# Patient Record
Sex: FEMALE | Race: WHITE | Employment: FULL TIME | URBAN - NONMETROPOLITAN AREA
[De-identification: names, ages, dates, MRNs, and addresses within clinical notes are randomized per-mention and may not be internally consistent; named-entity substitution may affect disease eponyms.]

---

## 2018-02-27 ENCOUNTER — OFFICE VISIT (OUTPATIENT)
Dept: URGENT CARE | Age: 33
End: 2018-02-27
Payer: COMMERCIAL

## 2018-02-27 VITALS
TEMPERATURE: 98.4 F | BODY MASS INDEX: 35.78 KG/M2 | HEART RATE: 112 BPM | RESPIRATION RATE: 20 BRPM | OXYGEN SATURATION: 96 % | WEIGHT: 202 LBS | SYSTOLIC BLOOD PRESSURE: 133 MMHG | DIASTOLIC BLOOD PRESSURE: 88 MMHG

## 2018-02-27 DIAGNOSIS — H61.21 HEARING LOSS OF RIGHT EAR DUE TO CERUMEN IMPACTION: Primary | ICD-10-CM

## 2018-02-27 PROCEDURE — 69209 REMOVE IMPACTED EAR WAX UNI: CPT | Performed by: NURSE PRACTITIONER

## 2018-02-27 PROCEDURE — 99212 OFFICE O/P EST SF 10 MIN: CPT | Performed by: NURSE PRACTITIONER

## 2018-02-27 NOTE — PROGRESS NOTES
[address] [facility]  [address] [facility] [place] [address]  Dept: [phone]  Loc: [phone]      David Ly is c/o of Otalgia        HPI:     HPI  Patient presents with right ear fullness. She noticed the sensation two days ago, but upon waking up this morning she noted that her hear is fully clogged. There is only slight tenderness, she cannot hear. There has been no drainage from the ear. History reviewed. No pertinent past medical history. Past Surgical History:   Procedure Laterality Date     SECTION         History reviewed. No pertinent family history. Social History   Substance Use Topics    Smoking status: Never Smoker    Smokeless tobacco: Never Used    Alcohol use No      No current outpatient prescriptions on file. No current facility-administered medications for this visit. No Known Allergies    Health Maintenance   Topic Date Due    HIV screen  1999    DTaP/Tdap/Td vaccine (1 - Tdap) 2003    Cervical cancer screen  2005    Flu vaccine (1) 2017       Subjective:      Review of Systems   HENT: Positive for ear pain (fullness) and hearing loss. All other systems reviewed and are negative. Objective:     Physical Exam   Constitutional: She is oriented to person, place, and time. She appears well-developed and well-nourished. No distress. HENT:   Head: Normocephalic and atraumatic. Right Ear: External ear normal.   Left Ear: Tympanic membrane and external ear normal.   R canal fully occluded   Eyes: Conjunctivae and EOM are normal. Pupils are equal, round, and reactive to light. Right eye exhibits no discharge. Left eye exhibits no discharge. No scleral icterus. Neck: Normal range of motion. Neck supple. No JVD present. No thyromegaly present. Cardiovascular: Normal rate, regular rhythm and normal heart sounds. No murmur heard.   Pulmonary/Chest: Effort normal and breath sounds normal. No respiratory distress. Abdominal: Soft. Bowel sounds are normal. She exhibits no distension. There is no tenderness. Musculoskeletal: Normal range of motion. Neurological: She is alert and oriented to person, place, and time. No cranial nerve deficit. Skin: Skin is warm and dry. No rash noted. She is not diaphoretic. Psychiatric: She has a normal mood and affect. Her behavior is normal. Judgment normal.   Nursing note and vitals reviewed. /88   Pulse 112   Temp 98.4 °F (36.9 °C)   Resp 20   Wt 202 lb (91.6 kg)   SpO2 96%   BMI 35.78 kg/m²     Assessment/ Plan      1. Hearing loss of right ear due to cerumen impaction  NC REMOVAL IMPACTED CERUMEN IRRIGATION/LVG UNILAT       Orders Placed This Encounter   Procedures    NC REMOVAL IMPACTED CERUMEN IRRIGATION/LVG UNILAT        Patient given educational materials - see patient instructions. Discussed use, benefit, and side effects of prescribed medications. All patient questions answered. Pt voiced understanding. Patient agreed with treatment plan. Follow up as needed    Patient Instructions       Patient Education        Earwax Blockage: Care Instructions  Your Care Instructions    Earwax is a natural substance that protects the ear canal. Normally, earwax drains from the ears and does not cause problems. Sometimes earwax builds up and hardens. Earwax blockage (also called cerumen impaction) can cause some loss of hearing and pain. When wax is tightly packed, you will need to have your doctor remove it. Follow-up care is a key part of your treatment and safety. Be sure to make and go to all appointments, and call your doctor if you are having problems. It's also a good idea to know your test results and keep a list of the medicines you take. How can you care for yourself at home? · Do not try to remove earwax with cotton swabs, fingers, or other objects. This can make the blockage worse and damage the eardrum.   · If

## 2018-02-27 NOTE — PROGRESS NOTES
RIGHT EARS IRRIGATED WITH  ELEPHANT EAR IRRIGATION SYSTEM. LARGE AMOUNT OF EAR WAX REMOVED WITH WATER AND CURAGE.  PT TOLERATED WELL WITH PROVIDER RECHECKING EARS POST PROCEDURE

## 2018-05-22 ENCOUNTER — OFFICE VISIT (OUTPATIENT)
Dept: OBGYN | Age: 33
End: 2018-05-22
Payer: COMMERCIAL

## 2018-05-22 VITALS
WEIGHT: 189 LBS | TEMPERATURE: 98.5 F | BODY MASS INDEX: 32.27 KG/M2 | HEART RATE: 97 BPM | SYSTOLIC BLOOD PRESSURE: 133 MMHG | DIASTOLIC BLOOD PRESSURE: 94 MMHG | RESPIRATION RATE: 18 BRPM | HEIGHT: 64 IN

## 2018-05-22 DIAGNOSIS — Z80.0 FAMILY HISTORY OF COLON CANCER: ICD-10-CM

## 2018-05-22 DIAGNOSIS — Z12.4 SCREENING FOR CERVICAL CANCER: ICD-10-CM

## 2018-05-22 DIAGNOSIS — Z80.3 FAMILY HISTORY OF BREAST CANCER: ICD-10-CM

## 2018-05-22 DIAGNOSIS — Z01.419 ENCOUNTER FOR GYNECOLOGICAL EXAMINATION WITHOUT ABNORMAL FINDING: Primary | ICD-10-CM

## 2018-05-22 DIAGNOSIS — L68.0 HIRSUTISM: ICD-10-CM

## 2018-05-22 DIAGNOSIS — Z76.89 ENCOUNTER TO ESTABLISH CARE WITH NEW DOCTOR: ICD-10-CM

## 2018-05-22 PROCEDURE — 99385 PREV VISIT NEW AGE 18-39: CPT

## 2018-05-22 ASSESSMENT — ENCOUNTER SYMPTOMS
EYE PAIN: 0
ABDOMINAL PAIN: 0
DIARRHEA: 0
SHORTNESS OF BREATH: 0
RECTAL PAIN: 0
TROUBLE SWALLOWING: 0
CONSTIPATION: 0
CHEST TIGHTNESS: 0
SORE THROAT: 0
COUGH: 0
BLOOD IN STOOL: 0
BACK PAIN: 0
EYE REDNESS: 0

## 2018-05-23 ENCOUNTER — TELEPHONE (OUTPATIENT)
Dept: OBGYN | Age: 33
End: 2018-05-23

## 2018-05-23 DIAGNOSIS — R35.0 URINARY FREQUENCY: Primary | ICD-10-CM

## 2018-05-23 DIAGNOSIS — R30.0 DYSURIA: ICD-10-CM

## 2018-05-23 DIAGNOSIS — R35.0 URINARY FREQUENCY: ICD-10-CM

## 2018-05-23 LAB
BACTERIA: NEGATIVE /HPF
BILIRUBIN URINE: NEGATIVE
BLOOD, URINE: ABNORMAL
CLARITY: CLEAR
COLOR: YELLOW
EPITHELIAL CELLS, UA: 6 /HPF (ref 0–5)
GLUCOSE URINE: NEGATIVE MG/DL
HYALINE CASTS: 2 /HPF (ref 0–8)
KETONES, URINE: NEGATIVE MG/DL
LEUKOCYTE ESTERASE, URINE: ABNORMAL
NITRITE, URINE: NEGATIVE
PH UA: 6.5
PROTEIN UA: NEGATIVE MG/DL
RBC UA: 9 /HPF (ref 0–4)
SPECIFIC GRAVITY UA: 1.02
URINE REFLEX TO CULTURE: YES
UROBILINOGEN, URINE: 1 E.U./DL
WBC UA: 15 /HPF (ref 0–5)

## 2018-05-25 ENCOUNTER — TELEPHONE (OUTPATIENT)
Dept: OBGYN | Age: 33
End: 2018-05-25

## 2018-05-25 LAB
ORGANISM: ABNORMAL
URINE CULTURE, ROUTINE: ABNORMAL
URINE CULTURE, ROUTINE: ABNORMAL

## 2018-05-30 DIAGNOSIS — L68.0 HIRSUTISM: Primary | ICD-10-CM

## 2018-05-30 DIAGNOSIS — N39.0 URINARY TRACT INFECTION WITHOUT HEMATURIA, SITE UNSPECIFIED: ICD-10-CM

## 2018-05-30 RX ORDER — SPIRONOLACTONE 50 MG/1
50 TABLET, FILM COATED ORAL DAILY
Qty: 60 TABLET | Refills: 1 | Status: SHIPPED | OUTPATIENT
Start: 2018-05-30 | End: 2018-10-13 | Stop reason: SDUPTHER

## 2018-05-30 RX ORDER — NITROFURANTOIN 25; 75 MG/1; MG/1
100 CAPSULE ORAL 2 TIMES DAILY
Qty: 14 CAPSULE | Refills: 0 | Status: SHIPPED | OUTPATIENT
Start: 2018-05-30 | End: 2018-06-06

## 2018-10-13 DIAGNOSIS — L68.0 HIRSUTISM: ICD-10-CM

## 2018-10-15 RX ORDER — SPIRONOLACTONE 50 MG/1
TABLET, FILM COATED ORAL
Qty: 60 TABLET | Refills: 1 | Status: SHIPPED | OUTPATIENT
Start: 2018-10-15 | End: 2018-12-17 | Stop reason: SDUPTHER

## 2018-11-02 ENCOUNTER — APPOINTMENT (OUTPATIENT)
Dept: GENERAL RADIOLOGY | Age: 33
End: 2018-11-02
Payer: COMMERCIAL

## 2018-11-02 ENCOUNTER — OFFICE VISIT (OUTPATIENT)
Dept: URGENT CARE | Age: 33
End: 2018-11-02

## 2018-11-02 ENCOUNTER — HOSPITAL ENCOUNTER (EMERGENCY)
Age: 33
Discharge: HOME | End: 2018-11-02
Attending: EMERGENCY MEDICINE
Payer: COMMERCIAL

## 2018-11-02 VITALS
RESPIRATION RATE: 20 BRPM | BODY MASS INDEX: 31.71 KG/M2 | TEMPERATURE: 97.5 F | OXYGEN SATURATION: 98 % | HEART RATE: 90 BPM | DIASTOLIC BLOOD PRESSURE: 89 MMHG | WEIGHT: 179 LBS | SYSTOLIC BLOOD PRESSURE: 134 MMHG | HEIGHT: 63 IN

## 2018-11-02 VITALS
RESPIRATION RATE: 18 BRPM | BODY MASS INDEX: 31.89 KG/M2 | SYSTOLIC BLOOD PRESSURE: 120 MMHG | HEIGHT: 63 IN | WEIGHT: 180 LBS | HEART RATE: 88 BPM | DIASTOLIC BLOOD PRESSURE: 90 MMHG | OXYGEN SATURATION: 95 % | TEMPERATURE: 98.1 F

## 2018-11-02 DIAGNOSIS — R07.9 CHEST PAIN, UNSPECIFIED TYPE: Primary | ICD-10-CM

## 2018-11-02 DIAGNOSIS — R74.8 LIVER ENZYME ELEVATION: ICD-10-CM

## 2018-11-02 LAB
ALBUMIN SERPL-MCNC: 5.3 G/DL (ref 3.5–5.2)
ALP BLD-CCNC: 71 U/L (ref 35–104)
ALT SERPL-CCNC: 37 U/L (ref 5–33)
ANION GAP SERPL CALCULATED.3IONS-SCNC: 12 MMOL/L (ref 7–19)
AST SERPL-CCNC: 23 U/L (ref 5–32)
BILIRUB SERPL-MCNC: 0.3 MG/DL (ref 0.2–1.2)
BUN BLDV-MCNC: 14 MG/DL (ref 6–20)
CALCIUM SERPL-MCNC: 10.2 MG/DL (ref 8.6–10)
CHLORIDE BLD-SCNC: 101 MMOL/L (ref 98–111)
CO2: 27 MMOL/L (ref 22–29)
CREAT SERPL-MCNC: 0.8 MG/DL (ref 0.5–0.9)
D DIMER: <0.27 UG/ML FEU (ref 0–0.48)
EKG P AXIS: 49 DEGREES
EKG P AXIS: 51 DEGREES
EKG P-R INTERVAL: 158 MS
EKG P-R INTERVAL: 170 MS
EKG Q-T INTERVAL: 356 MS
EKG Q-T INTERVAL: 358 MS
EKG QRS DURATION: 84 MS
EKG QRS DURATION: 88 MS
EKG QTC CALCULATION (BAZETT): 408 MS
EKG QTC CALCULATION (BAZETT): 413 MS
EKG T AXIS: 34 DEGREES
EKG T AXIS: 52 DEGREES
GFR NON-AFRICAN AMERICAN: >60
GLUCOSE BLD-MCNC: 84 MG/DL (ref 74–109)
HCG QUALITATIVE: NEGATIVE
HCT VFR BLD CALC: 43.9 % (ref 37–47)
HEMOGLOBIN: 14.7 G/DL (ref 12–16)
LV EF: 50 %
LVEF MODALITY: NORMAL
MCH RBC QN AUTO: 29.3 PG (ref 27–31)
MCHC RBC AUTO-ENTMCNC: 33.5 G/DL (ref 33–37)
MCV RBC AUTO: 87.5 FL (ref 81–99)
PDW BLD-RTO: 12.1 % (ref 11.5–14.5)
PERFORMED ON: NORMAL
PERFORMED ON: NORMAL
PLATELET # BLD: 333 K/UL (ref 130–400)
PMV BLD AUTO: 9.2 FL (ref 9.4–12.3)
POC TROPONIN I: 0 NG/ML (ref 0–0.08)
POC TROPONIN I: 0 NG/ML (ref 0–0.08)
POTASSIUM SERPL-SCNC: 4.2 MMOL/L (ref 3.5–5)
RBC # BLD: 5.02 M/UL (ref 4.2–5.4)
SODIUM BLD-SCNC: 140 MMOL/L (ref 136–145)
TOTAL PROTEIN: 8.7 G/DL (ref 6.6–8.7)
WBC # BLD: 8.9 K/UL (ref 4.8–10.8)

## 2018-11-02 PROCEDURE — 84703 CHORIONIC GONADOTROPIN ASSAY: CPT

## 2018-11-02 PROCEDURE — 36415 COLL VENOUS BLD VENIPUNCTURE: CPT

## 2018-11-02 PROCEDURE — 99999 PR OFFICE/OUTPT VISIT,PROCEDURE ONLY: CPT | Performed by: SPECIALIST

## 2018-11-02 PROCEDURE — 71046 X-RAY EXAM CHEST 2 VIEWS: CPT

## 2018-11-02 PROCEDURE — 99285 EMERGENCY DEPT VISIT HI MDM: CPT | Performed by: EMERGENCY MEDICINE

## 2018-11-02 PROCEDURE — 93005 ELECTROCARDIOGRAM TRACING: CPT

## 2018-11-02 PROCEDURE — 85027 COMPLETE CBC AUTOMATED: CPT

## 2018-11-02 PROCEDURE — 99285 EMERGENCY DEPT VISIT HI MDM: CPT

## 2018-11-02 PROCEDURE — 93350 STRESS TTE ONLY: CPT

## 2018-11-02 PROCEDURE — 85379 FIBRIN DEGRADATION QUANT: CPT

## 2018-11-02 PROCEDURE — 84484 ASSAY OF TROPONIN QUANT: CPT

## 2018-11-02 PROCEDURE — 80053 COMPREHEN METABOLIC PANEL: CPT

## 2018-11-02 ASSESSMENT — ENCOUNTER SYMPTOMS
VOMITING: 0
EYE PAIN: 0
DIARRHEA: 0
SHORTNESS OF BREATH: 0
ABDOMINAL PAIN: 0

## 2018-11-02 ASSESSMENT — HEART SCORE: ECG: 0

## 2018-11-02 NOTE — ED PROVIDER NOTES
 TUBAL LIGATION  2007    Dr. Wade Singh       Previous Medications    SPIRONOLACTONE (ALDACTONE) 50 MG TABLET    TAKE 1 TABLET BY MOUTH EVERY DAY       ALLERGIES     Patient has no known allergies. FAMILY HISTORY       Family History   Problem Relation Age of Onset    Breast Cancer Mother 72    Colon Cancer Maternal Grandmother         Uterine and color          SOCIAL HISTORY       Social History     Social History    Marital status:      Spouse name: N/A    Number of children: N/A    Years of education: N/A     Social History Main Topics    Smoking status: Never Smoker    Smokeless tobacco: Never Used    Alcohol use No    Drug use: No    Sexual activity: Not Asked     Other Topics Concern    None     Social History Narrative    None       SCREENINGS      Heart Score for chest pain patients  History: Slightly Suspicious  ECG: Normal  Patient Age: < 45 years  *Risk factors for Atherosclerotic disease: Hypertension, Positive family History, Obesity  Risk Factors: > 3 Risk factors or history of atherosclerotic disease*  Troponin: < 1X normal limit  Heart Score Total: 2      PHYSICAL EXAM    (up to 7 for level 4, 8 or more for level 5)     ED Triage Vitals [11/02/18 0919]   BP Temp Temp src Pulse Resp SpO2 Height Weight   135/85 98.4 °F (36.9 °C) -- 100 18 97 % 5' 3\" (1.6 m) 180 lb (81.6 kg)       Physical Exam   Constitutional: She is oriented to person, place, and time. She appears well-developed. No distress. HENT:   Head: Normocephalic and atraumatic. Eyes: Pupils are equal, round, and reactive to light. No scleral icterus. Neck: Normal range of motion. Neck supple. No JVD present. Cardiovascular: Normal rate, regular rhythm, normal heart sounds and intact distal pulses. Pulmonary/Chest: Effort normal and breath sounds normal. No respiratory distress. Abdominal: Soft. She exhibits no distension. There is no tenderness.    Musculoskeletal: She exhibits no edema or tenderness. Neurological: She is alert and oriented to person, place, and time. Skin: Skin is warm and dry. Psychiatric: She has a normal mood and affect. Her behavior is normal.   Vitals reviewed. DIAGNOSTIC RESULTS     EKG: All EKG's are interpreted by the Emergency Department Physician who either signs or Co-signs this chart in the absence of a cardiologist.    Normal sinus rhythm. Normal QT interval. No evidence of ischemia. Repeat EKG shows normal sinus rhythm. Normal QT interval. No evidence of ischemia. RADIOLOGY:   Non-plain film images such as CT, Ultrasound and MRI are read by the radiologist. [employer] images are visualized and preliminarily interpreted by the emergency physician with the below findings:    Interpretation per the Radiologist below, if available at the time of this note:    XR CHEST STANDARD (2 VW)   Final Result   No active cardiopulmonary disease. The above finding are recorded on a digital voice clip in PACS. Signed by Dr Dorothy Garner on 11/2/2018 9:54 AM            LABS:  Labs Reviewed   CBC - Abnormal; Notable for the following:        Result Value    MPV 9.2 (*)     All other components within normal limits   COMPREHENSIVE METABOLIC PANEL - Abnormal; Notable for the following:     Calcium 10.2 (*)     Alb 5.3 (*)     ALT 37 (*)     All other components within normal limits   HCG, SERUM, QUALITATIVE   D-DIMER, QUANTITATIVE   POCT TROPONIN   POCT TROPONIN   POCT VENOUS   POCT VENOUS       All other labs were within normal range or not returned as of this dictation. EMERGENCY DEPARTMENT COURSE and DIFFERENTIALDIAGNOSIS/MDM:   Vitals:    Vitals:    11/02/18 1030 11/02/18 1100 11/02/18 1130 11/02/18 1200   BP:  112/72 116/70 114/68   Pulse:       Resp: 18 18 18 16   Temp:       SpO2:  97% 96% 95%   Weight:       Height:           MDM  Patient resting comfortably. No distress at this time. Nontoxic on exam. Asymptomatic.  Stress echo was done which

## 2018-11-02 NOTE — PROGRESS NOTES
The patient came in to our clinic with complaints of \"chest discomfort\". In triage the patient kept stating that \"it feels like someone has their hand in my chest and is squeezing my heart really tight\". The patient rates her pain at a 4 on a scale from 1-10. The patient reports that she has a family history of heart disease. The patient was seen my CALDERON De Oliveira. Adrienne recommended that the patient go to the ED for further evaluation. The patient was offered an ambulance, but declined. The patient left our clinic in stable condition. Reviewed above.  CALDERON Tamayo

## 2018-12-17 DIAGNOSIS — L68.0 HIRSUTISM: ICD-10-CM

## 2018-12-17 RX ORDER — SPIRONOLACTONE 50 MG/1
TABLET, FILM COATED ORAL
Qty: 90 TABLET | Refills: 1 | Status: SHIPPED | OUTPATIENT
Start: 2018-12-17 | End: 2019-06-15 | Stop reason: SDUPTHER

## 2019-02-01 ENCOUNTER — OFFICE VISIT (OUTPATIENT)
Dept: RETAIL CLINIC | Facility: CLINIC | Age: 34
End: 2019-02-01

## 2019-02-01 VITALS
SYSTOLIC BLOOD PRESSURE: 102 MMHG | DIASTOLIC BLOOD PRESSURE: 74 MMHG | BODY MASS INDEX: 32.14 KG/M2 | OXYGEN SATURATION: 98 % | TEMPERATURE: 98.6 F | HEART RATE: 83 BPM | WEIGHT: 181.4 LBS | RESPIRATION RATE: 18 BRPM | HEIGHT: 63 IN

## 2019-02-01 DIAGNOSIS — R39.15 URINARY URGENCY: ICD-10-CM

## 2019-02-01 DIAGNOSIS — N30.91 CYSTITIS WITH HEMATURIA: Primary | ICD-10-CM

## 2019-02-01 LAB
BILIRUB BLD-MCNC: NEGATIVE MG/DL
CLARITY, POC: CLEAR
COLOR UR: ABNORMAL
GLUCOSE UR STRIP-MCNC: NEGATIVE MG/DL
KETONES UR QL: NEGATIVE
LEUKOCYTE EST, POC: ABNORMAL
NITRITE UR-MCNC: NEGATIVE MG/ML
PH UR: 6 [PH] (ref 5–8)
PROT UR STRIP-MCNC: NEGATIVE MG/DL
RBC # UR STRIP: ABNORMAL /UL
SP GR UR: 1.02 (ref 1–1.03)
UROBILINOGEN UR QL: NORMAL

## 2019-02-01 PROCEDURE — 99203 OFFICE O/P NEW LOW 30 MIN: CPT | Performed by: NURSE PRACTITIONER

## 2019-02-01 PROCEDURE — 81003 URINALYSIS AUTO W/O SCOPE: CPT | Performed by: NURSE PRACTITIONER

## 2019-02-01 RX ORDER — SPIRONOLACTONE 50 MG/1
50 TABLET, FILM COATED ORAL DAILY
Refills: 1 | COMMUNITY
Start: 2018-12-23

## 2019-02-01 RX ORDER — SULFAMETHOXAZOLE AND TRIMETHOPRIM 800; 160 MG/1; MG/1
1 TABLET ORAL 2 TIMES DAILY
Qty: 6 TABLET | Refills: 0 | Status: SHIPPED | OUTPATIENT
Start: 2019-02-01 | End: 2019-02-04

## 2019-02-01 NOTE — PATIENT INSTRUCTIONS
You have been diagnosed with a UTI or bladder infection.You have been counseled with the results of your urinalysis.  An antibiotic is prescribed for you today that needs to be taken until gone, whether or not you feel better. It is recommended you take a probiotic when taking an antibiotic. Probiotics are found over the counter in pill form and in some yogurt brands, both are recommended.    Increase fluid intake such as water and low sugar cranberry juice. Avoid caffeine as it irritates the bladder wall. Take over the counter AZO Standard as directed for no more than 2 days, for bladder pain. This medication will turn your urine orange or red.  Use Motrin or Tylenol for fever/pain.     For recurrent infections it is best to avoid bathing in a tub, always wipe front to back, urinate before and after intercourse, avoid tight fitting pants, and wear cotton underwear.    Go to your primary care provider, urgent care center, or emergency room if no improvement or you have increase in symptoms such as fever, nausea, vomiting, flank pain or other concerns.     We are sending your urine to [facility] for culture.  We will call you with results in 3-4 days.  If you have not heard from us after four days please call for results.

## 2019-02-01 NOTE — PROGRESS NOTES
Chief Complaint   Patient presents with   • Urinary Urgency     Subjective   Georgie Mcmanus is a 33 y.o. female who presents to the clinic today with complaints of urinary urgency, frequency, mild burning and pressure with urination.   Urinary Tract Infection    This is a new problem. The current episode started today (around 3am). The problem has been gradually worsening. The quality of the pain is described as burning. There has been no fever. She is sexually active. There is no history of pyelonephritis. Associated symptoms include frequency and urgency. Pertinent negatives include no chills, discharge, flank pain, hematuria, hesitancy, nausea, possible pregnancy or vomiting. She has tried nothing for the symptoms. There is no history of kidney stones or recurrent UTIs.     Current Outpatient Medications:   •  spironolactone (ALDACTONE) 50 MG tablet, Take 50 mg by mouth Daily., Disp: , Rfl: 1    Allergies:  Patient has no known allergies.    Past Medical History:   Diagnosis Date   • Hypertension      Past Surgical History:   Procedure Laterality Date   • TUBAL ABDOMINAL LIGATION       Family History   Problem Relation Age of Onset   • Cancer Mother    • Cancer Father    • Heart disease Father    • Cancer Maternal Grandmother    • Heart disease Maternal Grandfather    • Stroke Paternal Grandmother    • Cancer Paternal Grandfather      Social History     Tobacco Use   • Smoking status: Never Smoker   • Smokeless tobacco: Never Used   Substance Use Topics   • Alcohol use: Yes     Comment: 2x/year   • Drug use: Defer       Review of Systems  Review of Systems   Constitutional: Negative for chills.   Gastrointestinal: Negative for abdominal pain, constipation, diarrhea, nausea and vomiting.   Genitourinary: Positive for dysuria, frequency and urgency. Negative for flank pain, genital sores, hematuria, hesitancy, vaginal bleeding, vaginal discharge and vaginal pain.       Objective   /74   Pulse 83   Temp 98.6  "°F (37 °C) (Oral)   Resp 18   Ht 160 cm (63\")   Wt 82.3 kg (181 lb 6.4 oz)   LMP 01/07/2019 (Exact Date)   SpO2 98%   Breastfeeding? No   BMI 32.13 kg/m²       Physical Exam   Constitutional: She is oriented to person, place, and time. She appears well-developed and well-nourished. No distress.   Cardiovascular: Normal rate, regular rhythm and normal heart sounds.   Pulmonary/Chest: Effort normal and breath sounds normal. No respiratory distress.   Abdominal: Soft. Normal appearance and bowel sounds are normal. There is no tenderness. There is no rigidity, no rebound, no guarding and no CVA tenderness.   Neurological: She is alert and oriented to person, place, and time.   Skin: Skin is warm and dry.   Psychiatric: She has a normal mood and affect. Her behavior is normal.       Assessment/Plan     Georgie was seen today for urinary urgency.    Diagnoses and all orders for this visit:    Cystitis with hematuria  -     Cancel: Urine Culture - Urine, Urine, Clean Catch; Future  -     Urine Culture - Urine, Urine, Clean Catch    Urinary urgency  -     POC Urinalysis Dipstick, Automated    Other orders  -     sulfamethoxazole-trimethoprim (BACTRIM DS,SEPTRA DS) 800-160 MG per tablet; Take 1 tablet by mouth 2 (Two) Times a Day for 3 days.          POC Urinalysis Dipstick, Automated   Order: [id number]   Status:  Final result   Visible to patient:  No (Not Released)   Dx:  Urinary urgency    Ref Range & Units 08:23   Color Yellow, Straw, Dark Yellow, Leida Leida    Clarity, UA Clear Clear    Specific Gravity  1.005 - 1.030 1.025    pH, Urine 5.0 - 8.0 6.0    Leukocytes Negative Trace Abnormal     Nitrite, UA Negative Negative    Protein, POC Negative mg/dL Negative    Glucose, UA Negative, 1000 mg/dL (3+) mg/dL Negative    Ketones, UA Negative Negative    Urobilinogen, UA Normal Normal    Bilirubin Negative Negative    Blood, UA Negative Small Abnormal     Resulting Agency  [facility]       "   Specimen Collected: 02/01/19 08:23 Last Resulted: 02/01/19 08:25           You have been diagnosed with a UTI or bladder infection.You have been counseled with the results of your urinalysis.  An antibiotic is prescribed for you today that needs to be taken until gone, whether or not you feel better. It is recommended you take a probiotic when taking an antibiotic. Probiotics are found over the counter in pill form and in some yogurt brands, both are recommended.    Increase fluid intake such as water and low sugar cranberry juice. Avoid caffeine as it irritates the bladder wall. Take over the counter AZO Standard as directed for no more than 2 days, for bladder pain. This medication will turn your urine orange or red.  Use Motrin or Tylenol for fever/pain.     For recurrent infections it is best to avoid bathing in a tub, always wipe front to back, urinate before and after intercourse, avoid tight fitting pants, and wear cotton underwear.    Go to your primary care provider, urgent care center, or emergency room if no improvement or you have increase in symptoms such as fever, nausea, vomiting, flank pain or other concerns.     We are sending your urine to [facility] for culture.  We will call you with results in 3-4 days.  If you have not heard from us after four days please call for results.

## 2019-02-03 LAB
BACTERIA UR CULT: NORMAL
BACTERIA UR CULT: NORMAL

## 2019-02-05 ENCOUNTER — TELEPHONE (OUTPATIENT)
Dept: RETAIL CLINIC | Facility: CLINIC | Age: 34
End: 2019-02-05

## 2019-02-05 NOTE — TELEPHONE ENCOUNTER
Patient notified of urine culture results.  Patient states she is feeling much better.  Instructed patient if symptoms re-occur, to follow up with her primary care physician.  Patient voiced understanding.

## 2019-03-26 ENCOUNTER — OFFICE VISIT (OUTPATIENT)
Dept: URGENT CARE | Age: 34
End: 2019-03-26
Payer: COMMERCIAL

## 2019-03-26 VITALS
BODY MASS INDEX: 32.95 KG/M2 | RESPIRATION RATE: 18 BRPM | TEMPERATURE: 97.4 F | SYSTOLIC BLOOD PRESSURE: 122 MMHG | DIASTOLIC BLOOD PRESSURE: 82 MMHG | WEIGHT: 186 LBS | OXYGEN SATURATION: 98 % | HEART RATE: 77 BPM

## 2019-03-26 DIAGNOSIS — J02.9 SORE THROAT: Primary | ICD-10-CM

## 2019-03-26 DIAGNOSIS — J02.0 STREP PHARYNGITIS: ICD-10-CM

## 2019-03-26 LAB — S PYO AG THROAT QL: POSITIVE

## 2019-03-26 PROCEDURE — 87880 STREP A ASSAY W/OPTIC: CPT | Performed by: NURSE PRACTITIONER

## 2019-03-26 PROCEDURE — 99213 OFFICE O/P EST LOW 20 MIN: CPT | Performed by: NURSE PRACTITIONER

## 2019-03-26 RX ORDER — AMOXICILLIN 500 MG/1
500 CAPSULE ORAL 2 TIMES DAILY
Qty: 20 CAPSULE | Refills: 0 | Status: SHIPPED | OUTPATIENT
Start: 2019-03-26 | End: 2019-04-05

## 2019-03-26 ASSESSMENT — ENCOUNTER SYMPTOMS
TROUBLE SWALLOWING: 1
VOICE CHANGE: 0
ABDOMINAL PAIN: 0
NAUSEA: 0
COUGH: 0
RHINORRHEA: 0
RESPIRATORY NEGATIVE: 1
VOMITING: 0
SORE THROAT: 1
EYES NEGATIVE: 1
WHEEZING: 0

## 2019-06-15 DIAGNOSIS — L68.0 HIRSUTISM: ICD-10-CM

## 2019-06-17 RX ORDER — SPIRONOLACTONE 50 MG/1
TABLET, FILM COATED ORAL
Qty: 90 TABLET | Refills: 1 | Status: SHIPPED | OUTPATIENT
Start: 2019-06-17 | End: 2019-12-08 | Stop reason: SDUPTHER

## 2019-07-08 ENCOUNTER — OFFICE VISIT (OUTPATIENT)
Dept: OBGYN | Age: 34
End: 2019-07-08
Payer: COMMERCIAL

## 2019-07-08 VITALS
WEIGHT: 178 LBS | HEIGHT: 63 IN | HEART RATE: 96 BPM | BODY MASS INDEX: 31.54 KG/M2 | SYSTOLIC BLOOD PRESSURE: 123 MMHG | DIASTOLIC BLOOD PRESSURE: 86 MMHG

## 2019-07-08 DIAGNOSIS — Z79.899 ENCOUNTER FOR LONG-TERM (CURRENT) USE OF MEDICATIONS: ICD-10-CM

## 2019-07-08 DIAGNOSIS — Z80.3 FAMILY HISTORY OF BREAST CANCER: ICD-10-CM

## 2019-07-08 DIAGNOSIS — Z01.419 ENCOUNTER FOR GYNECOLOGICAL EXAMINATION WITHOUT ABNORMAL FINDING: ICD-10-CM

## 2019-07-08 DIAGNOSIS — Z11.51 SCREENING FOR HPV (HUMAN PAPILLOMAVIRUS): ICD-10-CM

## 2019-07-08 DIAGNOSIS — Z12.4 ENCOUNTER FOR SCREENING FOR CERVICAL CANCER: ICD-10-CM

## 2019-07-08 DIAGNOSIS — N92.6 IRREGULAR MENSES: Primary | ICD-10-CM

## 2019-07-08 PROCEDURE — 99395 PREV VISIT EST AGE 18-39: CPT | Performed by: ADVANCED PRACTICE MIDWIFE

## 2019-07-08 ASSESSMENT — ENCOUNTER SYMPTOMS
EYES NEGATIVE: 1
GASTROINTESTINAL NEGATIVE: 1
ALLERGIC/IMMUNOLOGIC NEGATIVE: 1
RESPIRATORY NEGATIVE: 1

## 2019-07-08 NOTE — PROGRESS NOTES
Pt presents today for pap smear and breast exam.     Last mammogram:  Never had one  Last pap smear:  2018, normal  Contraception:  TL  :  2  Para:  2  AB:  0  Last bone density:  Never had one  Last colonoscopy: never had one  Menarche: 4years old  LMP: 2019  Menses: regular now due to aldactone

## 2019-07-16 LAB
HPV SAMPLE: NORMAL
HPV TYPE 16: NOT DETECTED
HPV TYPE 18: NOT DETECTED
HPV, HIGH RISK OTHER: NOT DETECTED
INTERPRETATION: NORMAL
SOURCE: NORMAL

## 2019-07-22 DIAGNOSIS — Z79.899 ENCOUNTER FOR LONG-TERM (CURRENT) USE OF MEDICATIONS: ICD-10-CM

## 2019-07-22 DIAGNOSIS — N92.6 IRREGULAR MENSES: ICD-10-CM

## 2019-07-22 LAB
ALBUMIN SERPL-MCNC: 4.6 G/DL (ref 3.5–5.2)
ALP BLD-CCNC: 42 U/L (ref 35–104)
ALT SERPL-CCNC: 25 U/L (ref 5–33)
ANION GAP SERPL CALCULATED.3IONS-SCNC: 13 MMOL/L (ref 7–19)
AST SERPL-CCNC: 20 U/L (ref 5–32)
BILIRUB SERPL-MCNC: 0.3 MG/DL (ref 0.2–1.2)
BUN BLDV-MCNC: 19 MG/DL (ref 6–20)
CALCIUM SERPL-MCNC: 10 MG/DL (ref 8.6–10)
CHLORIDE BLD-SCNC: 104 MMOL/L (ref 98–111)
CO2: 24 MMOL/L (ref 22–29)
CORTISOL - AM: 11.6 UG/DL (ref 6.2–19.4)
CREAT SERPL-MCNC: 0.6 MG/DL (ref 0.5–0.9)
GFR NON-AFRICAN AMERICAN: >60
GLUCOSE BLD-MCNC: 91 MG/DL (ref 74–109)
HCT VFR BLD CALC: 38.8 % (ref 37–47)
HEMOGLOBIN: 12.5 G/DL (ref 12–16)
INSULIN: 12.9 MU/L (ref 2.6–24.9)
MCH RBC QN AUTO: 29.5 PG (ref 27–31)
MCHC RBC AUTO-ENTMCNC: 32.2 G/DL (ref 33–37)
MCV RBC AUTO: 91.5 FL (ref 81–99)
PDW BLD-RTO: 12.3 % (ref 11.5–14.5)
PLATELET # BLD: 316 K/UL (ref 130–400)
PMV BLD AUTO: 9.9 FL (ref 9.4–12.3)
POTASSIUM SERPL-SCNC: 5 MMOL/L (ref 3.5–5)
RBC # BLD: 4.24 M/UL (ref 4.2–5.4)
SODIUM BLD-SCNC: 141 MMOL/L (ref 136–145)
TOTAL PROTEIN: 7.9 G/DL (ref 6.6–8.7)
WBC # BLD: 7.2 K/UL (ref 4.8–10.8)

## 2019-12-08 DIAGNOSIS — L68.0 HIRSUTISM: ICD-10-CM

## 2019-12-09 RX ORDER — SPIRONOLACTONE 50 MG/1
TABLET, FILM COATED ORAL
Qty: 90 TABLET | Refills: 1 | Status: SHIPPED | OUTPATIENT
Start: 2019-12-09 | End: 2020-07-09 | Stop reason: SDUPTHER

## 2020-03-05 ENCOUNTER — OFFICE VISIT (OUTPATIENT)
Dept: RETAIL CLINIC | Facility: CLINIC | Age: 35
End: 2020-03-05

## 2020-03-05 VITALS — TEMPERATURE: 99.1 F | HEART RATE: 86 BPM | SYSTOLIC BLOOD PRESSURE: 112 MMHG | DIASTOLIC BLOOD PRESSURE: 70 MMHG

## 2020-03-05 DIAGNOSIS — R35.0 FREQUENCY OF URINATION: Primary | ICD-10-CM

## 2020-03-05 PROBLEM — Z80.3 FAMILY HISTORY OF BREAST CANCER: Status: ACTIVE | Noted: 2018-05-22

## 2020-03-05 PROBLEM — Z80.0 FAMILY HISTORY OF COLON CANCER: Status: ACTIVE | Noted: 2018-05-22

## 2020-03-05 LAB
BILIRUB BLD-MCNC: NEGATIVE MG/DL
CLARITY, POC: ABNORMAL
COLOR UR: YELLOW
GLUCOSE UR STRIP-MCNC: NEGATIVE MG/DL
KETONES UR QL: NEGATIVE
LEUKOCYTE EST, POC: NEGATIVE
NITRITE UR-MCNC: NEGATIVE MG/ML
PH UR: 5.5 [PH] (ref 5–8)
PROT UR STRIP-MCNC: NEGATIVE MG/DL
RBC # UR STRIP: NEGATIVE /UL
SP GR UR: 1.03 (ref 1–1.03)
UROBILINOGEN UR QL: NORMAL

## 2020-03-05 PROCEDURE — 81003 URINALYSIS AUTO W/O SCOPE: CPT | Performed by: NURSE PRACTITIONER

## 2020-03-05 PROCEDURE — 99213 OFFICE O/P EST LOW 20 MIN: CPT | Performed by: NURSE PRACTITIONER

## 2020-03-05 RX ORDER — CEFDINIR 300 MG/1
600 CAPSULE ORAL DAILY
Qty: 10 CAPSULE | Refills: 0 | Status: SHIPPED | OUTPATIENT
Start: 2020-03-05 | End: 2020-03-10

## 2020-03-05 NOTE — PATIENT INSTRUCTIONS
Urinary Frequency, Adult  Urinary frequency means urinating more often than usual. You may urinate every 1-2 hours even though you drink a normal amount of fluid and do not have a bladder infection or condition. Although you urinate more often than normal, the total amount of urine produced in a day is normal.  With urinary frequency, you may have an urgent need to urinate often. The stress and anxiety of needing to find a bathroom quickly can make this urge worse. This condition may go away on its own or you may need treatment at home. Home treatment may include bladder training, exercises, taking medicines, or making changes to your diet.  Follow these instructions at home:  Bladder health    · Keep a bladder diary if told by your health care provider. Keep track of:  ? What you eat and drink.  ? How often you urinate.  ? How much you urinate.  · Follow a bladder training program if told by your health care provider. This may include:  ? Learning to delay going to the bathroom.  ? Double urinating (voiding). This helps if you are not completely emptying your bladder.  ? Scheduled voiding.  · Do Kegel exercises as told by your health care provider. Kegel exercises strengthen the muscles that help control urination, which may help the condition.  Eating and drinking  · If told by your health care provider, make diet changes, such as:  ? Avoiding caffeine.  ? Drinking fewer fluids, especially alcohol.  ? Not drinking in the evening.  ? Avoiding foods or drinks that may irritate the bladder. These include coffee, tea, soda, artificial sweeteners, citrus, tomato-based foods, and chocolate.  ? Eating foods that help prevent or ease constipation. Constipation can make this condition worse. Your health care provider may recommend that you:  § Drink enough fluid to keep your urine pale yellow.  § Take over-the-counter or prescription medicines.  § Eat foods that are high in fiber, such as beans, whole grains, and fresh  fruits and vegetables.  § Limit foods that are high in fat and processed sugars, such as fried or sweet foods.  General instructions  · Take over-the-counter and prescription medicines only as told by your health care provider.  · Keep all follow-up visits as told by your health care provider. This is important.  Contact a health care provider if:  · You start urinating more often.  · You feel pain or irritation when you urinate.  · You notice blood in your urine.  · Your urine looks cloudy.  · You develop a fever.  · You begin vomiting.  Get help right away if:  · You are unable to urinate.  Summary  · Urinary frequency means urinating more often than usual. With urinary frequency, you may urinate every 1-2 hours even though you drink a normal amount of fluid and do not have a bladder infection or other bladder condition.  · Your health care provider may recommend that you keep a bladder diary, follow a bladder training program, or make dietary changes.  · If told by your health care provider, do Kegel exercises to strengthen the muscles that help control urination.  · Take over-the-counter and prescription medicines only as told by your health care provider.  · Contact a health care provider if your symptoms do not improve or get worse.  This information is not intended to replace advice given to you by your health care provider. Make sure you discuss any questions you have with your health care provider.  Document Released: 10/14/2010 Document Revised: 06/27/2019 Document Reviewed: 06/27/2019  [employer] Interactive Patient Education © 2020 [employer]

## 2020-03-05 NOTE — PROGRESS NOTES
"Akila Mcmanus is a 34 y.o. female.     Symptoms ongoing for a month.  Last culture \"was negative but they prescribed me an antibiotic and the symptoms were gone within a few days.\"    Urinary Tract Infection    This is a new problem. The current episode started 1 to 4 weeks ago (approx 1 month). The problem occurs every urination. The problem has been unchanged. Quality: just frequency and urgency. The patient is experiencing no pain. There has been no fever. She is sexually active. There is no history of pyelonephritis. Associated symptoms include frequency and urgency. Pertinent negatives include no chills, discharge, flank pain, hematuria, hesitancy, nausea, possible pregnancy, sweats or vomiting. She has tried nothing for the symptoms.        The following portions of the patient's history were reviewed and updated as appropriate: allergies, current medications, past family history, past medical history, past social history, past surgical history and problem list.    Review of Systems   Constitutional: Negative for chills and fever.   Respiratory: Negative for shortness of breath.    Cardiovascular: Negative for chest pain.   Gastrointestinal: Negative for abdominal pain, diarrhea, nausea and vomiting.   Genitourinary: Positive for frequency and urgency. Negative for dysuria, flank pain, hematuria and hesitancy.   Musculoskeletal: Negative for neck pain and neck stiffness.       Objective      /70   Pulse 86   Temp 99.1 °F (37.3 °C)     Physical Exam   Constitutional: She is oriented to person, place, and time. She appears well-developed and well-nourished. No distress.   Eyes: Pupils are equal, round, and reactive to light.   Neck: Normal range of motion. Neck supple.   Cardiovascular: Normal rate.   Pulmonary/Chest: Effort normal.   Abdominal: Soft. There is no tenderness. There is no CVA tenderness.   Musculoskeletal: Normal range of motion.   Neurological: She is alert and oriented to " person, place, and time. No cranial nerve deficit.   Skin: Skin is warm and dry. Capillary refill takes less than 2 seconds.   Psychiatric: She has a normal mood and affect.   Nursing note and vitals reviewed.    Lab Results (last 24 hours)     Procedure Component Value Units Date/Time    POCT urinalysis dipstick, automated [[id number]]  (Abnormal) Collected:  03/05/20 1531    Specimen:  Urine Updated:  03/05/20 1532     Color Yellow     Clarity, UA Cloudy     Specific Gravity  1.030     pH, Urine 5.5     Leukocytes Negative     Nitrite, UA Negative     Protein, POC Negative mg/dL      Glucose, UA Negative mg/dL      Ketones, UA Negative     Urobilinogen, UA Normal     Bilirubin Negative     Blood, UA Negative            Assessment/Plan   Georgie was seen today for urinary tract infection.    Diagnoses and all orders for this visit:    Frequency of urination  -     POCT urinalysis dipstick, automated    Other orders  -     cefdinir (OMNICEF) 300 MG capsule; Take 2 capsules by mouth Daily for 5 days.      I do not have a microscope with which to examine urine, so I did elect to treat for symptoms with 5 days of antibiotic, though I favor other reasons for frequency in differential (I.e. OAB, hormonal etiologies, possibly dropped bladder, etc; all of which were discussed with patient).  Advised to follow up with a HIGHER LEVEL OF CARE should symptoms persist.  Encouraged hydration with water.    [address]    NOTE: Patient is self pay.

## 2020-07-07 NOTE — PROGRESS NOTES
Pt presents today for pap smear and breast exam.  She also complains of     Last mammogram:  never  Last pap smear:  , negative  Contraception:  TL  :  2  Para:  2  AB:  0  Last bone density:  never  Last colonoscopy: never  Menarche:   LMP:   Menses: ***/***

## 2020-07-09 ENCOUNTER — OFFICE VISIT (OUTPATIENT)
Dept: OBGYN | Age: 35
End: 2020-07-09
Payer: MEDICARE

## 2020-07-09 VITALS
HEART RATE: 100 BPM | SYSTOLIC BLOOD PRESSURE: 124 MMHG | DIASTOLIC BLOOD PRESSURE: 84 MMHG | WEIGHT: 180 LBS | HEIGHT: 63 IN | BODY MASS INDEX: 31.89 KG/M2

## 2020-07-09 PROCEDURE — 99395 PREV VISIT EST AGE 18-39: CPT | Performed by: ADVANCED PRACTICE MIDWIFE

## 2020-07-09 RX ORDER — SPIRONOLACTONE 50 MG/1
TABLET, FILM COATED ORAL
Qty: 90 TABLET | Refills: 3 | Status: SHIPPED | OUTPATIENT
Start: 2020-07-09 | End: 2021-07-19

## 2020-07-09 ASSESSMENT — ENCOUNTER SYMPTOMS
ALLERGIC/IMMUNOLOGIC NEGATIVE: 1
RESPIRATORY NEGATIVE: 1
EYES NEGATIVE: 1
GASTROINTESTINAL NEGATIVE: 1

## 2020-07-09 NOTE — PROGRESS NOTES
[facility]  CNM Office Note    Link Javier is a 38 y.o. female who presents today for her medical conditions/ complaints as noted below. Chief Complaint   Patient presents with    Annual Exam         HPI  William eMlo presents for annual exam. She reports normal regular periods and no sexual dysfunction. She has no complaints today. She desires genetic screening for hereditary cancer risk assessment. Last mammogram:  na  Last pap smear:  2019  Contraception:  TL  :  2  Para:  2  AB:    Last bone density: na   Last colonoscopy:   na     GYN:  9 / reg / 4.      Patient Active Problem List   Diagnosis    Family history of breast cancer    Family history of colon cancer       No LMP recorded. [id number]    No past medical history on file. Past Surgical History:   Procedure Laterality Date     SECTION      TUBAL LIGATION      Dr. Keke Burnett     Family History   Problem Relation Age of Onset    Breast Cancer Mother 72    Colon Cancer Maternal Grandmother         Uterine and color     Social History     Tobacco Use    Smoking status: Never Smoker    Smokeless tobacco: Never Used   Substance Use Topics    Alcohol use: No       Current Outpatient Medications   Medication Sig Dispense Refill    spironolactone (ALDACTONE) 50 MG tablet TAKE 1 TABLET BY MOUTH EVERY DAY 90 tablet 1    metFORMIN (GLUCOPHAGE) 500 MG tablet Take 1 tablet by mouth 2 times daily (with meals) 180 tablet 2     No current facility-administered medications for this visit. No Known Allergies  Vitals:    20 0842   BP: 124/84   Pulse: 100     Body mass index is 31.89 kg/m². Review of Systems   Constitutional: Negative. HENT: Negative. Eyes: Negative. Respiratory: Negative. Cardiovascular: Negative. Gastrointestinal: Negative. Endocrine: Negative. Genitourinary: Negative. Musculoskeletal: Negative. Skin: Negative. Allergic/Immunologic: Negative.     Neurological: Negative. Hematological: Negative. Psychiatric/Behavioral: Negative. Physical Exam  Constitutional:       Appearance: She is well-developed. HENT:      Head: Normocephalic and atraumatic. Eyes:      Conjunctiva/sclera: Conjunctivae normal.      Pupils: Pupils are equal, round, and reactive to light. Neck:      Musculoskeletal: Normal range of motion and neck supple. Thyroid: No thyromegaly. Trachea: No tracheal deviation. Cardiovascular:      Rate and Rhythm: Normal rate and regular rhythm. Heart sounds: Normal heart sounds. No murmur. Pulmonary:      Effort: Pulmonary effort is normal. No respiratory distress. Breath sounds: Normal breath sounds. Chest:      Comments: Breasts symmetrical without tenderness, masses, or nipple discharge. Nipples everted bilaterally. Abdominal:      General: There is no distension. Palpations: Abdomen is soft. Tenderness: There is no abdominal tenderness. There is no guarding. Genitourinary:     Vagina: Normal.      Cervix: No cervical motion tenderness, discharge or friability. Adnexa:         Right: No mass, tenderness or fullness. Left: No mass, tenderness or fullness. Rectum: Normal.      Comments: EGBUS normal. Vulva reveals no erythema, lesions or atrophic changes. Vagina normal, no lesions, polyps or discharge. Cervix is normal, smooth pink mucosa. No Lesions, no polyps. Musculoskeletal: Normal range of motion. Skin:     General: Skin is warm and dry. Capillary Refill: Capillary refill takes less than 2 seconds. Neurological:      Mental Status: She is alert and oriented to person, place, and time. Psychiatric:         Behavior: Behavior normal.         Thought Content: Thought content normal.         Judgment: Judgment normal.          Diagnosis Orders   1. Encounter for well woman exam with routine gynecological exam  PAP SMEAR   2. Screening for cervical cancer  PAP SMEAR   3. Encounter for mammogram to establish baseline mammogram  DUARTE DIGITAL SCREEN W CAD BILATERAL       MEDICATIONS:  No orders of the defined types were placed in this encounter. ORDERS:  Orders Placed This Encounter   Procedures    DUARTE DIGITAL SCREEN W CAD BILATERAL    PAP SMEAR       PLAN:  1. WWE - PAP not indicated. SBE reviewed and CBE performed. Baseline mammogram ordered due to family hx. [facility] requested and pt sent to lab.

## 2020-07-09 NOTE — PATIENT INSTRUCTIONS
Patient Education        Breast Self-Exam: Care Instructions  Your Care Instructions     A breast self-exam is when you check your breasts for lumps or changes. This regular exam helps you learn how your breasts normally look and feel. Most breast problems or changes are not because of cancer. Breast self-exam is not a substitute for a mammogram. Having regular breast exams by your doctor and regular mammograms improve your chances of finding any problems with your breasts. Some women set a time each month to do a step-by-step breast self-exam. Other women like a less formal system. They might look at their breasts as they brush their teeth, or feel their breasts once in a while in the shower. If you notice a change in your breast, tell your doctor. Follow-up care is a key part of your treatment and safety. Be sure to make and go to all appointments, and call your doctor if you are having problems. It's also a good idea to know your test results and keep a list of the medicines you take. How do you do a breast self-exam?  · The best time to examine your breasts is usually one week after your menstrual period begins. Your breasts should not be tender then. If you do not have periods, you might do your exam on a day of the month that is easy to remember. · To examine your breasts:  ? Remove all your clothes above the waist and lie down. When you are lying down, your breast tissue spreads evenly over your chest wall, which makes it easier to feel all your breast tissue. ? Use the pads--not the fingertips--of the 3 middle fingers of your left hand to check your right breast. Move your fingers slowly in small coin-sized circles that overlap. ? Use three levels of pressure to feel of all your breast tissue. Use light pressure to feel the tissue close to the skin surface. Use medium pressure to feel a little deeper. Use firm pressure to feel your tissue close to your breastbone and ribs.  Use each pressure level to feel your breast tissue before moving on to the next spot. ? Check your entire breast, moving up and down as if following a strip from the collarbone to the bra line, and from the armpit to the ribs. Repeat until you have covered the entire breast.  ? Repeat this procedure for your left breast, using the pads of the 3 middle fingers of your right hand. · To examine your breasts while in the shower:  ? Place one arm over your head and lightly soap your breast on that side. ? Using the pads of your fingers, gently move your hand over your breast (in the strip pattern described above), feeling carefully for any lumps or changes. ? Repeat for the other breast.  · Have your doctor inspect anything you notice to see if you need further testing. Where can you learn more? Go to https://chjericaeb.Osteoplastics. org and sign in to your [employer] account. Enter P148 in the IIZI group box to learn more about \"Breast Self-Exam: Care Instructions. \"     If you do not have an account, please click on the \"Sign Up Now\" link. Current as of: August 22, 2019               Content Version: 12.5  © 9356-1941 Healthwise, Incorporated. Care instructions adapted under license by [facility] ([facility]). If you have questions about a medical condition or this instruction, always ask your healthcare professional. [address] any warranty or liability for your use of this information. Patient Education        Body Mass Index: Care Instructions  Your Care Instructions     Body mass index (BMI) can help you see if your weight is raising your risk for health problems. It uses a formula to compare how much you weigh with how tall you are. · A BMI lower than 18.5 is considered underweight. · A BMI between 18.5 and 24.9 is considered healthy. · A BMI between 25 and 29.9 is considered overweight. A BMI of 30 or higher is considered obese.   If your BMI is in the normal range, it means that you have a doctor about steps you can take to stay healthy or improve your health. You may need to make lifestyle changes to lose weight and stay healthy, such as changing your diet and getting regular exercise. If you have a BMI lower than 18.5  · Your doctor may do other tests to check your risk for health problems. · Talk with your doctor about steps you can take to stay healthy or improve your health. You may need to make lifestyle changes to gain or maintain weight and stay healthy, such as getting more healthy foods in your diet and doing exercises to build muscle. Where can you learn more? Go to https://marisol.Datorama. org and sign in to your [email or web address] account. Enter S176 in the [employer] box to learn more about \"Body Mass Index: Care Instructions. \"     If you do not have an account, please click on the \"Sign Up Now\" link. Current as of: December 11, 2019               Content Version: 12.5  © 5725-4374 [employer]. Care instructions adapted under license by [facility] ([facility]). If you have questions about a medical condition or this instruction, always ask your healthcare professional. [address] any warranty or liability for your use of this information. Patient Education        A Healthy Lifestyle: Care Instructions  Your Care Instructions     A healthy lifestyle can help you feel good, stay at a healthy weight, and have plenty of energy for both work and play. A healthy lifestyle is something you can share with your whole family. A healthy lifestyle also can lower your risk for serious health problems, such as high blood pressure, heart disease, and diabetes. You can follow a few steps listed below to improve your health and the health of your family. Follow-up care is a key part of your treatment and safety. Be sure to make and go to all appointments, and call your doctor if you are having problems.  It's also a good idea to know your test results and keep a list of the medicines you take. How can you care for yourself at home? · Do not eat too much sugar, fat, or fast foods. You can still have dessert and treats now and then. The goal is moderation. · Start small to improve your eating habits. Pay attention to portion sizes, drink less juice and soda pop, and eat more fruits and vegetables. ? Eat a healthy amount of food. A 3-ounce serving of meat, for example, is about the size of a deck of cards. Fill the rest of your plate with vegetables and whole grains. ? Limit the amount of soda and sports drinks you have every day. Drink more water when you are thirsty. ? Eat at least 5 servings of fruits and vegetables every day. It may seem like a lot, but it is not hard to reach this goal. A serving or helping is 1 piece of fruit, 1 cup of vegetables, or 2 cups of leafy, raw vegetables. Have an apple or some carrot sticks as an afternoon snack instead of a candy bar. Try to have fruits and/or vegetables at every meal.  · Make exercise part of your daily routine. You may want to start with simple activities, such as walking, bicycling, or slow swimming. Try to be active 30 to 60 minutes every day. You do not need to do all 30 to 60 minutes all at once. For example, you can exercise 3 times a day for 10 or 20 minutes. Moderate exercise is safe for most people, but it is always a good idea to talk to your doctor before starting an exercise program.  · Keep moving. Manya Olszewski the lawn, work in the garden, or [employer]. Take the stairs instead of the elevator at work. · If you smoke, quit. People who smoke have an increased risk for heart attack, stroke, cancer, and other lung illnesses. Quitting is hard, but there are ways to boost your chance of quitting tobacco for good. ? Use nicotine gum, patches, or lozenges. ? Ask your doctor about stop-smoking programs and medicines. ? Keep trying.   In addition to reducing your risk of diseases in the future, condom. · Testicular cancer exam. Ask your doctor whether you should check your testicles regularly. · Prostate exam. Talk to your doctor about whether you should have a blood test (called a PSA test) for prostate cancer. Experts differ on whether and when men should have this test. Some experts suggest it if you are older than 39 and are -American or have a father or brother who got prostate cancer when he was younger than 72. When should you call for help? Watch closely for changes in your health, and be sure to contact your doctor if you have any problems or symptoms that concern you. Where can you learn more? Go to https://UNXpepiceweb.healthRockabox. org and sign in to your [employer] account. Enter P072 in the I-Shake box to learn more about \"Well Visit, Ages 25 to 48: Care Instructions. \"     If you do not have an account, please click on the \"Sign Up Now\" link. Current as of: August 22, 2019               Content Version: 12.5  © 0082-6678 Healthwise, Incorporated. Care instructions adapted under license by [facility] ([facility]). If you have questions about a medical condition or this instruction, always ask your healthcare professional. [address] any warranty or liability for your use of this information.

## 2020-07-09 NOTE — PROGRESS NOTES
Pt is here for breast exam and pap smear. Last mammogram:  na  Last pap smear:  2019  Contraception:  TL  :  2  Para:  2  AB:    Last bone density: na   Last colonoscopy:   na    GYN:  9 / reg / 4.

## 2020-07-29 ENCOUNTER — TELEPHONE (OUTPATIENT)
Dept: OTHER | Age: 35
End: 2020-07-29

## 2020-07-29 ENCOUNTER — HOSPITAL ENCOUNTER (OUTPATIENT)
Dept: WOMENS IMAGING | Age: 35
Discharge: HOME | End: 2020-07-29
Payer: MEDICAID

## 2020-07-29 PROCEDURE — 77063 BREAST TOMOSYNTHESIS BI: CPT

## 2020-08-05 ENCOUNTER — TELEPHONE (OUTPATIENT)
Dept: OTHER | Age: 35
End: 2020-08-05

## 2020-08-05 NOTE — TELEPHONE ENCOUNTER
Completed a telephone post-test results disclosure discussion with patient. The patient's testing was Negative for a clinically actionable gene mutation. Tyrer-Cuzick was performed for the patient, it was reviewed in detail. We also reviewed family members are that may benefit from testing. Results mailed to patient, along with a contact number for [employer].

## 2021-07-09 NOTE — PATIENT INSTRUCTIONS
Patient Education        Breast Self-Exam: Care Instructions  Your Care Instructions     A breast self-exam is when you check your breasts for lumps or changes. This regular exam helps you learn how your breasts normally look and feel. Most breast problems or changes are not because of cancer. Breast self-exam is not a substitute for a mammogram. Having regular breast exams by your doctor and regular mammograms improve your chances of finding any problems with your breasts. Some women set a time each month to do a step-by-step breast self-exam. Other women like a less formal system. They might look at their breasts as they brush their teeth, or feel their breasts once in a while in the shower. If you notice a change in your breast, tell your doctor. Follow-up care is a key part of your treatment and safety. Be sure to make and go to all appointments, and call your doctor if you are having problems. It's also a good idea to know your test results and keep a list of the medicines you take. How do you do a breast self-exam?  · The best time to examine your breasts is usually one week after your menstrual period begins. Your breasts should not be tender then. If you do not have periods, you might do your exam on a day of the month that is easy to remember. · To examine your breasts:  ? Remove all your clothes above the waist and lie down. When you are lying down, your breast tissue spreads evenly over your chest wall, which makes it easier to feel all your breast tissue. ? Use the padsnot the fingertipsof the 3 middle fingers of your left hand to check your right breast. Move your fingers slowly in small coin-sized circles that overlap. ? Use three levels of pressure to feel of all your breast tissue. Use light pressure to feel the tissue close to the skin surface. Use medium pressure to feel a little deeper. Use firm pressure to feel your tissue close to your breastbone and ribs.  Use each pressure level to feel your breast tissue before moving on to the next spot. ? Check your entire breast, moving up and down as if following a strip from the collarbone to the bra line, and from the armpit to the ribs. Repeat until you have covered the entire breast.  ? Repeat this procedure for your left breast, using the pads of the 3 middle fingers of your right hand. · To examine your breasts while in the shower:  ? Place one arm over your head and lightly soap your breast on that side. ? Using the pads of your fingers, gently move your hand over your breast (in the strip pattern described above), feeling carefully for any lumps or changes. ? Repeat for the other breast.  · Have your doctor inspect anything you notice to see if you need further testing. Where can you learn more? Go to https://Connectjericaeb.efish [place]. org and sign in to your Ozmosis account. Enter P148 in the MoveThatBlock.com box to learn more about \"Breast Self-Exam: Care Instructions. \"     If you do not have an account, please click on the \"Sign Up Now\" link. Current as of: December 17, 2020               Content Version: 12.9  © 0527-3415 [employer]. Care instructions adapted under license by [facility] ([facility]). If you have questions about a medical condition or this instruction, always ask your healthcare professional. [address] any warranty or liability for your use of this information. Patient Education        Body Mass Index: Care Instructions  Your Care Instructions     Body mass index (BMI) can help you see if your weight is raising your risk for health problems. It uses a formula to compare how much you weigh with how tall you are. · A BMI lower than 18.5 is considered underweight. · A BMI between 18.5 and 24.9 is considered healthy. · A BMI between 25 and 29.9 is considered overweight. A BMI of 30 or higher is considered obese.   If your BMI is in the normal range, it means that you have a lower risk for weight-related health problems. If your BMI is in the overweight or obese range, you may be at increased risk for weight-related health problems, such as high blood pressure, heart disease, stroke, arthritis or joint pain, and diabetes. If your BMI is in the underweight range, you may be at increased risk for health problems such as fatigue, lower protection (immunity) against illness, muscle loss, bone loss, hair loss, and hormone problems. BMI is just one measure of your risk for weight-related health problems. You may be at higher risk for health problems if you are not active, you eat an unhealthy diet, or you drink too much alcohol or use tobacco products. Follow-up care is a key part of your treatment and safety. Be sure to make and go to all appointments, and call your doctor if you are having problems. It's also a good idea to know your test results and keep a list of the medicines you take. How can you care for yourself at home? · Practice healthy eating habits. This includes eating plenty of fruits, vegetables, whole grains, lean protein, and low-fat dairy. · If your doctor recommends it, get more exercise. Walking is a good choice. Bit by bit, increase the amount you walk every day. Try for at least 30 minutes on most days of the week. · Do not smoke. Smoking can increase your risk for health problems. If you need help quitting, talk to your doctor about stop-smoking programs and medicines. These can increase your chances of quitting for good. · Limit alcohol to 2 drinks a day for men and 1 drink a day for women. Too much alcohol can cause health problems. If you have a BMI higher than 25  · Your doctor may do other tests to check your risk for weight-related health problems. This may include measuring the distance around your waist. A waist measurement of more than 40 inches in men or 35 inches in women can increase the risk of weight-related health problems.   · Talk with your doctor about steps you can take to stay healthy or improve your health. You may need to make lifestyle changes to lose weight and stay healthy, such as changing your diet and getting regular exercise. If you have a BMI lower than 18.5  · Your doctor may do other tests to check your risk for health problems. · Talk with your doctor about steps you can take to stay healthy or improve your health. You may need to make lifestyle changes to gain or maintain weight and stay healthy, such as getting more healthy foods in your diet and doing exercises to build muscle. Where can you learn more? Go to https://marisol.Viewpoint Digital. org and sign in to your Toplist account. Enter S176 in the [employer] box to learn more about \"Body Mass Index: Care Instructions. \"     If you do not have an account, please click on the \"Sign Up Now\" link. Current as of: March 17, 2021               Content Version: 12.9  © 2006-2021 youcalc. Care instructions adapted under license by [facility] ([facility]). If you have questions about a medical condition or this instruction, always ask your healthcare professional. [place] [address] any warranty or liability for your use of this information. Patient Education        A Healthy Lifestyle: Care Instructions  Your Care Instructions     A healthy lifestyle can help you feel good, stay at a healthy weight, and have plenty of energy for both work and play. A healthy lifestyle is something you can share with your whole family. A healthy lifestyle also can lower your risk for serious health problems, such as high blood pressure, heart disease, and diabetes. You can follow a few steps listed below to improve your health and the health of your family. Follow-up care is a key part of your treatment and safety. Be sure to make and go to all appointments, and call your doctor if you are having problems.  It's also a good idea to know your test results and keep a list of the medicines you take. How can you care for yourself at home? · Do not eat too much sugar, fat, or fast foods. You can still have dessert and treats now and then. The goal is moderation. · Start small to improve your eating habits. Pay attention to portion sizes, drink less juice and soda pop, and eat more fruits and vegetables. ? Eat a healthy amount of food. A 3-ounce serving of meat, for example, is about the size of a deck of cards. Fill the rest of your plate with vegetables and whole grains. ? Limit the amount of soda and sports drinks you have every day. Drink more water when you are thirsty. ? Eat plenty of fruits and vegetables every day. Have an apple or some carrot sticks as an afternoon snack instead of a candy bar. Try to have fruits and/or vegetables at every meal.  · Make exercise part of your daily routine. You may want to start with simple activities, such as walking, bicycling, or slow swimming. Try to be active 30 to 60 minutes every day. You do not need to do all 30 to 60 minutes all at once. For example, you can exercise 3 times a day for 10 or 20 minutes. Moderate exercise is safe for most people, but it is always a good idea to talk to your doctor before starting an exercise program.  · Keep moving. Ana Coffeyk the lawn, work in the garden, or Lucibel. Take the stairs instead of the elevator at work. · If you smoke, quit. People who smoke have an increased risk for heart attack, stroke, cancer, and other lung illnesses. Quitting is hard, but there are ways to boost your chance of quitting tobacco for good. ? Use nicotine gum, patches, or lozenges. ? Ask your doctor about stop-smoking programs and medicines. ? Keep trying.   In addition to reducing your risk of diseases in the future, you will notice some benefits soon after you stop using tobacco. If you have shortness of breath or asthma symptoms, they will likely get better within a few weeks after you quit.  · Limit how much alcohol you drink. Moderate amounts of alcohol (up to 2 drinks a day for men, 1 drink a day for women) are okay. But drinking too much can lead to liver problems, high blood pressure, and other health problems. Family health  If you have a family, there are many things you can do together to improve your health. · Eat meals together as a family as often as possible. · Eat healthy foods. This includes fruits, vegetables, lean meats and dairy, and whole grains. · Include your family in your fitness plan. Most people think of activities such as jogging or tennis as the way to fitness, but there are many ways you and your family can be more active. Anything that makes you breathe hard and gets your heart pumping is exercise. Here are some tips:  ? Walk to do errands or to take your child to school or the bus.  ? Go for a family bike ride after dinner instead of watching TV. Where can you learn more? Go to https://Fast DrinksandreaIceracadence.2Peer (Qlipso). org and sign in to your Medicine in Practice account. Enter Z416 in the Victiv box to learn more about \"A Healthy Lifestyle: Care Instructions. \"     If you do not have an account, please click on the \"Sign Up Now\" link. Current as of: September 23, 2020               Content Version: 12.9  © 9807-7926 Healthwise, Incorporated. Care instructions adapted under license by [facility] ([facility]). If you have questions about a medical condition or this instruction, always ask your healthcare professional. [place] [address] any warranty or liability for your use of this information.

## 2021-07-12 ENCOUNTER — OFFICE VISIT (OUTPATIENT)
Dept: OBGYN CLINIC | Age: 36
End: 2021-07-12
Payer: MEDICAID

## 2021-07-12 VITALS
SYSTOLIC BLOOD PRESSURE: 139 MMHG | DIASTOLIC BLOOD PRESSURE: 92 MMHG | HEART RATE: 98 BPM | HEIGHT: 63 IN | WEIGHT: 183 LBS | BODY MASS INDEX: 32.43 KG/M2

## 2021-07-12 DIAGNOSIS — Z11.51 SCREENING FOR HPV (HUMAN PAPILLOMAVIRUS): ICD-10-CM

## 2021-07-12 DIAGNOSIS — Z12.4 SCREENING FOR CERVICAL CANCER: ICD-10-CM

## 2021-07-12 DIAGNOSIS — Z01.419 ENCOUNTER FOR WELL WOMAN EXAM WITH ROUTINE GYNECOLOGICAL EXAM: Primary | ICD-10-CM

## 2021-07-12 PROCEDURE — 99395 PREV VISIT EST AGE 18-39: CPT | Performed by: ADVANCED PRACTICE MIDWIFE

## 2021-07-12 ASSESSMENT — ENCOUNTER SYMPTOMS
EYES NEGATIVE: 1
ALLERGIC/IMMUNOLOGIC NEGATIVE: 1
RESPIRATORY NEGATIVE: 1
GASTROINTESTINAL NEGATIVE: 1

## 2021-07-12 NOTE — PROGRESS NOTES
Pt presents today for pap smear and breast exam.      Last mammogram: 2020  Last pap smear: 2019, negative  Contraception:  TL  : 2  Para: 2  AB: 0  Last bone density: never  Last colonoscopy: never  Menarche: 4years old  LMP: 2021  Menses: regular

## 2021-07-12 NOTE — PROGRESS NOTES
[facility]  CNM Office Note    Debora Blair is a 39 y.o. female who presents today for her medical conditions/ complaints as noted below. Chief Complaint   Patient presents with    Annual Exam     patient presents today for a pap smear and breast exam.         HPI  Andrew Farmer presents for annual gyn exam. She has no complaints. No sexual concerns. Last mammogram: 2020  Last pap smear: 2019, negative  Contraception:  TL  : 2  Para: 2  AB: 0  Last bone density: never  Last colonoscopy: never  Menarche: 4years old  LMP:   Menses: regular  Patient Active Problem List   Diagnosis    Family history of breast cancer    Family history of colon cancer       No LMP recorded. [id number]    History reviewed. No pertinent past medical history. Past Surgical History:   Procedure Laterality Date     SECTION      TUBAL LIGATION      Dr. Den Mcelroy     Family History   Problem Relation Age of Onset    Breast Cancer Mother 72    Colon Cancer Maternal Grandmother         Uterine and color     Social History     Tobacco Use    Smoking status: Never Smoker    Smokeless tobacco: Never Used   Substance Use Topics    Alcohol use: No       Current Outpatient Medications   Medication Sig Dispense Refill    metFORMIN (GLUCOPHAGE) 500 MG tablet Take 1 tablet by mouth 2 times daily (with meals) 180 tablet 3    spironolactone (ALDACTONE) 50 MG tablet Take 1 tablet PO daily 90 tablet 3     No current facility-administered medications for this visit. No Known Allergies  There were no vitals filed for this visit. Body mass index is 31.89 kg/m². Review of Systems   Constitutional: Negative. HENT: Negative. Eyes: Negative. Respiratory: Negative. Cardiovascular: Negative. Gastrointestinal: Negative. Endocrine: Negative. Genitourinary: Negative. Musculoskeletal: Negative. Skin: Negative. Allergic/Immunologic: Negative. Neurological: Negative.     Hematological: Negative. Psychiatric/Behavioral: Negative. Physical Exam  Constitutional:       Appearance: She is well-developed. HENT:      Head: Normocephalic and atraumatic. Eyes:      Conjunctiva/sclera: Conjunctivae normal.      Pupils: Pupils are equal, round, and reactive to light. Neck:      Thyroid: No thyromegaly. Trachea: No tracheal deviation. Cardiovascular:      Rate and Rhythm: Normal rate and regular rhythm. Heart sounds: Normal heart sounds. No murmur heard. Pulmonary:      Effort: Pulmonary effort is normal. No respiratory distress. Breath sounds: Normal breath sounds. Chest:      Comments: Breasts symmetrical without tenderness, masses, or nipple discharge. Nipples everted bilaterally. Abdominal:      General: There is no distension. Palpations: Abdomen is soft. Tenderness: There is no abdominal tenderness. There is no guarding. Genitourinary:     General: Normal vulva. Exam position: Lithotomy position. Labia:         Right: No rash or lesion. Left: No rash or lesion. Vagina: Normal. No erythema or lesions. Cervix: Normal.      Uterus: Normal. Not tender. Adnexa: Right adnexa normal and left adnexa normal.        Right: No mass, tenderness or fullness. Left: No mass, tenderness or fullness. Musculoskeletal:         General: Normal range of motion. Cervical back: Normal range of motion and neck supple. Skin:     General: Skin is warm and dry. Capillary Refill: Capillary refill takes less than 2 seconds. Neurological:      Mental Status: She is alert and oriented to person, place, and time. Psychiatric:         Behavior: Behavior normal.         Thought Content: Thought content normal.         Judgment: Judgment normal.          Diagnosis Orders   1. Encounter for well woman exam with routine gynecological exam  PAP SMEAR   2. Screening for cervical cancer  PAP SMEAR   3.  Screening for HPV (human papillomavirus)  Human papillomavirus (HPV) DNA probe thin prep high risk       MEDICATIONS:  No orders of the defined types were placed in this encounter. ORDERS:  No orders of the defined types were placed in this encounter. PLAN:  1. WWE - No pap indicated. SBE reviewed and CBE performed. No annual labs.

## 2021-07-17 DIAGNOSIS — L68.0 HIRSUTISM: ICD-10-CM

## 2021-07-19 RX ORDER — SPIRONOLACTONE 50 MG/1
TABLET, FILM COATED ORAL
Qty: 90 TABLET | Refills: 3 | Status: SHIPPED | OUTPATIENT
Start: 2021-07-19 | End: 2022-07-18 | Stop reason: SDUPTHER

## 2021-07-21 LAB
ALBUMIN SERPL-MCNC: 4.6 G/DL (ref 3.5–5.2)
ALP BLD-CCNC: 51 U/L (ref 35–104)
ALT SERPL-CCNC: 25 U/L (ref 5–33)
ANION GAP SERPL CALCULATED.3IONS-SCNC: 9 MMOL/L (ref 7–19)
AST SERPL-CCNC: 22 U/L (ref 5–32)
BILIRUB SERPL-MCNC: 0.3 MG/DL (ref 0.2–1.2)
BUN BLDV-MCNC: 13 MG/DL (ref 6–20)
CALCIUM SERPL-MCNC: 9.3 MG/DL (ref 8.6–10)
CHLORIDE BLD-SCNC: 105 MMOL/L (ref 98–111)
CHOLESTEROL, TOTAL: 178 MG/DL (ref 160–199)
CO2: 24 MMOL/L (ref 22–29)
CREAT SERPL-MCNC: 0.6 MG/DL (ref 0.5–0.9)
GFR AFRICAN AMERICAN: >59
GFR NON-AFRICAN AMERICAN: >60
GLUCOSE BLD-MCNC: 93 MG/DL (ref 74–109)
HDLC SERPL-MCNC: 41 MG/DL (ref 65–121)
LDL CHOLESTEROL CALCULATED: 121 MG/DL
POTASSIUM SERPL-SCNC: 4.5 MMOL/L (ref 3.5–5)
SODIUM BLD-SCNC: 138 MMOL/L (ref 136–145)
TOTAL PROTEIN: 7.7 G/DL (ref 6.6–8.7)
TRIGL SERPL-MCNC: 82 MG/DL (ref 0–149)

## 2022-07-15 NOTE — PATIENT INSTRUCTIONS
Patient Education        Breast Self-Exam: Care Instructions  Your Care Instructions     A breast self-exam is when you check your breasts for lumps or changes. This regular exam helps you learn how your breasts normally look and feel. Mostbreast problems or changes are not because of cancer. Breast self-exam is not a substitute for a mammogram. Having regular breast exams by your doctor and regular mammograms improve your chances of finding anyproblems with your breasts. Some women set a time each month to do a step-by-step breast self-exam. Other women like a less formal system. They might look at their breasts as they brushtheir teeth, or feel their breasts once in a while in the shower. If you notice a change in your breast, tell your doctor. Follow-up care is a key part of your treatment and safety. Be sure to make and go to all appointments, and call your doctor if you are having problems. It's also a good idea to know your test results and keep alist of the medicines you take. How do you do a breast self-exam?  The best time to examine your breasts is usually one week after your menstrual period begins. Your breasts should not be tender then. If you do not have periods, you might do your exam on a day of the month that is easy to remember. To examine your breasts:  Remove all your clothes above the waist and lie down. When you are lying down, your breast tissue spreads evenly over your chest wall, which makes it easier to feel all your breast tissue. Use the pads--not the fingertips--of the 3 middle fingers of your left hand to check your right breast. Move your fingers slowly in small coin-sized circles that overlap. Use three levels of pressure to feel of all your breast tissue. Use light pressure to feel the tissue close to the skin surface. Use medium pressure to feel a little deeper. Use firm pressure to feel your tissue close to your breastbone and ribs.  Use each pressure level to feel your breast tissue before moving on to the next spot. Check your entire breast, moving up and down as if following a strip from the collarbone to the bra line, and from the armpit to the ribs. Repeat until you have covered the entire breast.  Repeat this procedure for your left breast, using the pads of the 3 middle fingers of your right hand. To examine your breasts while in the shower:  Place one arm over your head and lightly soap your breast on that side. Using the pads of your fingers, gently move your hand over your breast (in the strip pattern described above), feeling carefully for any lumps or changes. Repeat for the other breast.  Have your doctor inspect anything you notice to see if you need further testing. Where can you learn more? Go to https://Lakoo.Merge [employer]. org and sign in to your FAMOCO account. Enter P148 in the P-Commerce box to learn more about \"Breast Self-Exam: Care Instructions. \"     If you do not have an account, please click on the \"Sign Up Now\" link. Current as of: September 8, 2021               Content Version: 13.3  © 5436-5565 Healthwise, [employer] Patents. Care instructions adapted under license by [facility] ([facility]). If you have questions about a medical condition or this instruction, always ask your healthcare professional. [place] [address] any warranty or liability for your use of this information. Patient Education        Well Visit, Ages 25 to 48: Care Instructions  Overview     Well visits can help you stay healthy. Your doctor has checked your overall health and may have suggested ways to take good care of yourself. Your doctor also may have recommended tests. At home, you can help prevent illness withhealthy eating, regular exercise, and other steps. Follow-up care is a key part of your treatment and safety. Be sure to make and go to all appointments, and call your doctor if you are having problems.  It's also a good idea to know your test results and keep alist of the medicines you take. How can you care for yourself at home? Get screening tests that you and your doctor decide on. Screening helps find diseases before any symptoms appear. Eat healthy foods. Choose fruits, vegetables, whole grains, protein, and low-fat dairy foods. Limit fat, especially saturated fat. Reduce salt in your diet. Limit alcohol. If you are a man, have no more than 2 drinks a day or 14 drinks a week. If you are a woman, have no more than 1 drink a day or 7 drinks a week. Get at least 30 minutes of physical activity on most days of the week. Walking is a good choice. You also may want to do other activities, such as running, swimming, cycling, or playing tennis or team sports. Discuss any changes in your exercise program with your doctor. Reach and stay at a healthy weight. This will lower your risk for many problems, such as obesity, diabetes, heart disease, and high blood pressure. Do not smoke or allow others to smoke around you. If you need help quitting, talk to your doctor about stop-smoking programs and medicines. These can increase your chances of quitting for good. Care for your mental health. It is easy to get weighed down by worry and stress. Learn strategies to manage stress, like deep breathing and mindfulness, and stay connected with your family and community. If you find you often feel sad or hopeless, talk with your doctor. Treatment can help. Talk to your doctor about whether you have any risk factors for sexually transmitted infections (STIs). You can help prevent STIs if you wait to have sex with a new partner (or partners) until you've each been tested for STIs. It also helps if you use condoms (male or female condoms) and if you limit your sex partners to one person who only has sex with you. Vaccines are available for some STIs, such as HPV. Use birth control if it's important to you to prevent pregnancy.  Talk with your doctor about the choices available and what might be best for you. If you think you may have a problem with alcohol or drug use, talk to your doctor. This includes prescription medicines (such as amphetamines and opioids) and illegal drugs (such as cocaine and methamphetamine). Your doctor can help you figure out what type of treatment is best for you. Protect your skin from too much sun. When you're outdoors from 10 a.m. to 4 p.m., stay in the shade or cover up with clothing and a hat with a wide brim. Wear sunglasses that block UV rays. Even when it's cloudy, put broad-spectrum sunscreen (SPF 30 or higher) on any exposed skin. See a dentist one or two times a year for checkups and to have your teeth cleaned. Wear a seat belt in the car. When should you call for help? Watch closely for changes in your health, and be sure to contact your doctor if you have any problems or symptoms that concern you. Where can you learn more? Go to [email or web address]://Sofie [email or web address] Theater Place. org and sign in to your [employer] account. Enter P072 in the Omise box to learn more about \"Well Visit, Ages 25 to 48: Care Instructions. \"     If you do not have an account, please click on the \"Sign Up Now\" link. Current as of: October 6, 2021               Content Version: 13.3  © 8547-8734 Healthwise, Incorporated. Care instructions adapted under license by [facility] ([facility]). If you have questions about a medical condition or this instruction, always ask your healthcare professional. [place] [address] any warranty or liability for your use of this information. Patient Education        Pap Test: Care Instructions  Overview     The Pap test (also called a Pap smear) is a screening test for cancer of the cervix, which is the lower part of the uterus that opens into the vagina. The test can help your doctor find early changes in the cells that could lead tocancer.   The sample of cells taken during your test has been sent to a lab so that an expert can look at the cells. It usually takes a week or two to get the resultsback. Follow-up care is a key part of your treatment and safety. Be sure to make and go to all appointments, and call your doctor if you are having problems. It's also a good idea to know your test results and keep alist of the medicines you take. What do the results mean? A normal result means that the test did not find any abnormal cells in the sample. An abnormal result can mean many things. Most of these are not cancer. The results of your test may be abnormal because: You have an infection of the vagina or cervix, such as a yeast infection. You have low estrogen levels after menopause that are causing the cells to change. You have cell changes that may be a sign of precancer or cancer. The results are ranked based on how serious the changes might be. If the results were abnormal, you may need to have other tests. If the results show changes that could be a sign of cancer, you may need a test called acolposcopy, which provides a more complete view of the cervix. Sometimes the lab cannot use the sample because it does not contain enough cells or was not preserved well. If so, you may need to have the test again. This is not common, but it does happen from time to time. When should you call for help? Watch closely for changes in your health, and be sure to contact your doctor if:    You have vaginal bleeding or pain for more than 2 days after the test. It is normal to have a small amount of bleeding for a day or two after the test.   Where can you learn more? Go to [email or web address] org and sign in to your Liebo account. Enter R064 in the [facility] box to learn more about \"Pap Test: Care Instructions. \"     If you do not have an account, please click on the \"Sign Up Now\" link.   Current as of: September 8, 2021               Content Version: 13.3  © 7805-5205 Healthwise, Incorporated. Care instructions adapted under license by [facility] ([facility]). If you have questions about a medical condition or this instruction, always ask your healthcare professional. [address] any warranty or liability for your use of this information.

## 2022-07-18 ENCOUNTER — OFFICE VISIT (OUTPATIENT)
Dept: OBGYN CLINIC | Age: 37
End: 2022-07-18
Payer: MEDICAID

## 2022-07-18 VITALS
WEIGHT: 187 LBS | BODY MASS INDEX: 33.13 KG/M2 | HEIGHT: 63 IN | DIASTOLIC BLOOD PRESSURE: 87 MMHG | HEART RATE: 93 BPM | SYSTOLIC BLOOD PRESSURE: 131 MMHG

## 2022-07-18 DIAGNOSIS — L68.0 HIRSUTISM: ICD-10-CM

## 2022-07-18 DIAGNOSIS — Z12.4 SCREENING FOR CERVICAL CANCER: ICD-10-CM

## 2022-07-18 DIAGNOSIS — Z01.419 ENCOUNTER FOR WELL WOMAN EXAM WITH ROUTINE GYNECOLOGICAL EXAM: Primary | ICD-10-CM

## 2022-07-18 PROCEDURE — 99395 PREV VISIT EST AGE 18-39: CPT | Performed by: ADVANCED PRACTICE MIDWIFE

## 2022-07-18 RX ORDER — SPIRONOLACTONE 50 MG/1
TABLET, FILM COATED ORAL
Qty: 90 TABLET | Refills: 3 | Status: SHIPPED | OUTPATIENT
Start: 2022-07-18

## 2022-07-18 ASSESSMENT — ENCOUNTER SYMPTOMS
EYES NEGATIVE: 1
RESPIRATORY NEGATIVE: 1
GASTROINTESTINAL NEGATIVE: 1
ALLERGIC/IMMUNOLOGIC NEGATIVE: 1

## 2022-07-18 NOTE — PROGRESS NOTES
Pt presents today for pap smear and breast exam.  She also complains of     Last mammogram: 2020  Last pap smear: 2019   Sexually active: Yes  Sexual preference: Male  Contraception: TL  : 2  Para: 2  AB: 0  Last bone density: Never   Last colonoscopy: never  Menarche: age 4  LMP: 2022  Menses: Regular

## 2022-07-18 NOTE — PROGRESS NOTES
[facility]  CNM Office Note    Jonathan Rojas is a 44 y.o. female who presents today for her medical conditions/ complaints as noted below. Chief Complaint   Patient presents with    Gynecologic Exam         HPI  Frandy Juan presents for annual gyn exam. She has no complaints, normal periods. Takes spironolactone for hirsutism which controls symptoms. Last mammogram: 2020  Last pap smear: 2019   Sexually active: Yes  Sexual preference: Male  Contraception: TL  : 2  Para: 2  AB: 0  Last bone density: Never   Last colonoscopy: never  Menarche: age 4  LMP: 2022  Menses: Regular  Patient Active Problem List   Diagnosis    Family history of breast cancer    Family history of colon cancer       No LMP recorded. [id number]    No past medical history on file. Past Surgical History:   Procedure Laterality Date     SECTION      TUBAL LIGATION      Dr. Barbara Moreno     Family History   Problem Relation Age of Onset    Breast Cancer Mother 72    Colon Cancer Maternal Grandmother         Uterine and color     Social History     Tobacco Use    Smoking status: Never    Smokeless tobacco: Never   Substance Use Topics    Alcohol use: No       Current Outpatient Medications   Medication Sig Dispense Refill    spironolactone (ALDACTONE) 50 MG tablet TAKE 1 TABLET BY MOUTH EVERY DAY 90 tablet 3     No current facility-administered medications for this visit. No Known Allergies  Vitals:    22 0944   BP: 131/87   Site: Left Upper Arm   Position: Sitting   Cuff Size: Medium Adult   Pulse: 93   Weight: 187 lb (84.8 kg)   Height: 5' 3\" (1.6 m)     Body mass index is 33.13 kg/m². Review of Systems   Constitutional: Negative. HENT: Negative. Eyes: Negative. Respiratory: Negative. Cardiovascular: Negative. Gastrointestinal: Negative. Endocrine: Negative. Genitourinary: Negative. Musculoskeletal: Negative. Skin: Negative. Allergic/Immunologic: Negative. Neurological: Negative. Hematological: Negative. Psychiatric/Behavioral: Negative. Physical Exam  Constitutional:       Appearance: She is well-developed. HENT:      Head: Normocephalic and atraumatic. Eyes:      Conjunctiva/sclera: Conjunctivae normal.      Pupils: Pupils are equal, round, and reactive to light. Neck:      Thyroid: No thyromegaly. Trachea: No tracheal deviation. Cardiovascular:      Rate and Rhythm: Normal rate and regular rhythm. Heart sounds: Normal heart sounds. No murmur heard. Pulmonary:      Effort: Pulmonary effort is normal. No respiratory distress. Breath sounds: Normal breath sounds. Chest:      Comments: Breasts symmetrical without tenderness, masses, or nipple discharge. Nipples everted bilaterally. Abdominal:      General: There is no distension. Palpations: Abdomen is soft. Tenderness: There is no abdominal tenderness. There is no guarding. Genitourinary:     General: Normal vulva. Exam position: Lithotomy position. Labia:         Right: No rash or lesion. Left: No rash or lesion. Vagina: Normal. No erythema or lesions. Cervix: Normal.      Uterus: Normal. Not tender. Adnexa: Right adnexa normal and left adnexa normal.        Right: No mass, tenderness or fullness. Left: No mass, tenderness or fullness. Musculoskeletal:         General: Normal range of motion. Cervical back: Normal range of motion and neck supple. Skin:     General: Skin is warm and dry. Capillary Refill: Capillary refill takes less than 2 seconds. Neurological:      Mental Status: She is alert and oriented to person, place, and time. Psychiatric:         Behavior: Behavior normal.         Thought Content: Thought content normal.         Judgment: Judgment normal.        Diagnosis Orders   1. Encounter for well woman exam with routine gynecological exam        2.  Screening for cervical cancer MEDICATIONS:  No orders of the defined types were placed in this encounter. ORDERS:  No orders of the defined types were placed in this encounter. PLAN:  WWE - PAP collected. SBE reviewed and CBE performed. Refill spironolactone.

## 2022-07-26 LAB
HPV COMMENT: NORMAL
HPV TYPE 16: NOT DETECTED
HPV TYPE 18: NOT DETECTED
HPVOH (OTHER TYPES): NOT DETECTED

## 2023-08-08 ENCOUNTER — OFFICE VISIT (OUTPATIENT)
Dept: OBGYN CLINIC | Age: 38
End: 2023-08-08
Payer: MEDICAID

## 2023-08-08 VITALS
DIASTOLIC BLOOD PRESSURE: 65 MMHG | BODY MASS INDEX: 33.3 KG/M2 | HEART RATE: 93 BPM | SYSTOLIC BLOOD PRESSURE: 116 MMHG | WEIGHT: 188 LBS

## 2023-08-08 DIAGNOSIS — Z01.419 ENCOUNTER FOR CERVICAL PAP SMEAR WITH PELVIC EXAM: Primary | ICD-10-CM

## 2023-08-08 DIAGNOSIS — L68.0 HIRSUTISM: ICD-10-CM

## 2023-08-08 DIAGNOSIS — Z11.51 ENCOUNTER FOR SCREENING FOR HUMAN PAPILLOMAVIRUS (HPV): ICD-10-CM

## 2023-08-08 DIAGNOSIS — Z12.4 ENCOUNTER FOR SCREENING FOR CERVICAL CANCER: ICD-10-CM

## 2023-08-08 LAB — HPV16+18+H RISK 12 DNA SPEC-IMP: NORMAL

## 2023-08-08 PROCEDURE — 99395 PREV VISIT EST AGE 18-39: CPT | Performed by: ADVANCED PRACTICE MIDWIFE

## 2023-08-08 RX ORDER — SPIRONOLACTONE 50 MG/1
TABLET, FILM COATED ORAL
Qty: 90 TABLET | Refills: 3 | Status: SHIPPED | OUTPATIENT
Start: 2023-08-08

## 2023-08-08 NOTE — PROGRESS NOTES
Pt presents today for pap smear and breast exam.    Mammo:  Pap smear:  Contraception:TL    P:2  Ab:0  Bone density:NA  Colonoscopy:NA
vaginal erythema or tenderness. No vaginal prolapse present. Right Adnexa: not tender and no mass present. Left Adnexa: not tender and no mass present. No cervical motion tenderness, discharge, friability or lesion. Uterus is not enlarged or tender. Pelvic floor neuro is intact. Pelvic exam was performed with patient in the lithotomy position. Breasts:     Right: Normal. No swelling, bleeding, mass, nipple discharge, skin change or tenderness. Left: Normal. No swelling, bleeding, mass, nipple discharge, skin change or tenderness. HENT:      Head: Normocephalic and atraumatic. Nose: Nose normal.   Eyes:      Conjunctiva/sclera: Conjunctivae normal.      Pupils: Pupils are equal, round, and reactive to light. Cardiovascular:      Rate and Rhythm: Normal rate and regular rhythm. Heart sounds: Normal heart sounds. Pulmonary:      Effort: Pulmonary effort is normal.      Breath sounds: Normal breath sounds. Abdominal:      General: Abdomen is flat. Palpations: Abdomen is soft. Musculoskeletal:         General: Normal range of motion. Lymphadenopathy:      Upper Body:      Right upper body: No supraclavicular or axillary adenopathy. Left upper body: No supraclavicular or axillary adenopathy. Neurological:      General: No focal deficit present. Mental Status: She is alert and oriented to person, place, and time. Skin:     General: Skin is warm and dry. Capillary Refill: Capillary refill takes less than 2 seconds. Psychiatric:         Mood and Affect: Mood normal.         Behavior: Behavior normal.         Thought Content: Thought content normal.         Judgment: Judgment normal.   Vitals and nursing note reviewed. MEDICATIONS:  No orders of the defined types were placed in this encounter. ORDERS:  No orders of the defined types were placed in this encounter. PLAN:  Refill medicines  WWE - PAP w HPV collected.  SBE

## 2023-08-16 LAB
HPV HR 12 DNA SPEC QL NAA+PROBE: NOT DETECTED
HPV16 DNA SPEC QL NAA+PROBE: NOT DETECTED
HPV16+18+H RISK 12 DNA SPEC-IMP: NORMAL
HPV18 DNA SPEC QL NAA+PROBE: NOT DETECTED

## 2024-04-12 ENCOUNTER — TELEPHONE (OUTPATIENT)
Dept: OBGYN CLINIC | Age: 39
End: 2024-04-12

## 2024-04-12 NOTE — TELEPHONE ENCOUNTER
Called pt and unable to reach. No vm available. Sent aleksandra gonzalezg to let pt know we are cancelling her appt on August the 8th due to her provider being out of the office and to call us back to reschedule her appt.

## 2024-08-27 ENCOUNTER — OFFICE VISIT (OUTPATIENT)
Dept: OBGYN CLINIC | Age: 39
End: 2024-08-27
Payer: MEDICAID

## 2024-08-27 VITALS
SYSTOLIC BLOOD PRESSURE: 147 MMHG | DIASTOLIC BLOOD PRESSURE: 93 MMHG | HEIGHT: 63 IN | BODY MASS INDEX: 31.54 KG/M2 | HEART RATE: 101 BPM | WEIGHT: 178 LBS

## 2024-08-27 DIAGNOSIS — Z12.4 ENCOUNTER FOR SCREENING FOR CERVICAL CANCER: ICD-10-CM

## 2024-08-27 DIAGNOSIS — Z12.31 ENCOUNTER FOR SCREENING MAMMOGRAM FOR MALIGNANT NEOPLASM OF BREAST: ICD-10-CM

## 2024-08-27 DIAGNOSIS — Z11.51 ENCOUNTER FOR SCREENING FOR HUMAN PAPILLOMAVIRUS (HPV): ICD-10-CM

## 2024-08-27 DIAGNOSIS — Z01.419 ENCOUNTER FOR CERVICAL PAP SMEAR WITH PELVIC EXAM: Primary | ICD-10-CM

## 2024-08-27 PROCEDURE — 99396 PREV VISIT EST AGE 40-64: CPT | Performed by: ADVANCED PRACTICE MIDWIFE

## 2024-08-27 NOTE — PATIENT INSTRUCTIONS
A Healthy Lifestyle: Care Instructions  A healthy lifestyle can help you feel good, have more energy, and stay at a weight that's healthy for you. You can share a healthy lifestyle with your friends and family. And you can do it on your own.    Eat meals with your friends or family. You could try cooking together.    Plan activities with other people. Go for a walk with a friend, try a free online fitness class, or join a sports league.    Eat a variety of healthy foods. These include fruits, vegetables, whole grains, low-fat dairy, and lean protein.    Choose healthy portions of food. You can use the Nutrition Facts label on food packages as a guide.    Eat more fruits and vegetables. You could add vegetables to sandwiches or add fruit to cereal.    Drink water when you are thirsty. Limit soda, juice, and sports drinks.    Try to exercise most days. Aim for at least 2½ hours of exercise each week.    Keep moving. Work in the garden or take your dog on a walk. Use the stairs instead of the elevator.    If you use tobacco or nicotine, try to quit. Ask your doctor about programs and medicines to help you quit.    Limit alcohol. Men should have no more than 2 drinks a day. Women should have no more than 1. For some people, no alcohol is the best choice.  Follow-up care is a key part of your treatment and safety. Be sure to make and go to all appointments, and call your doctor if you are having problems. It's also a good idea to know your test results and keep a list of the medicines you take.  Where can you learn more?  Go to https://www.Volance.net/patientEd and enter U807 to learn more about \"A Healthy Lifestyle: Care Instructions.\"  Current as of: August 6, 2023               Content Version: 14.0  © 2650-2608 Healthwise, Incorporated.   Care instructions adapted under license by [employer] If you have questions about a medical condition or this instruction, always ask your healthcare professional.  problems.  If you have a BMI higher than 25  Your doctor may do other tests to check your risk for weight-related health problems. This may include measuring the distance around your waist. A waist measurement of more than 40 inches in men or 35 inches in women can increase the risk of weight-related health problems.  Talk with your doctor about steps you can take to stay healthy or improve your health. You may need to make lifestyle changes to lose weight and stay healthy, such as changing your diet and getting regular exercise.  If you have a BMI lower than 18.5  Your doctor may do other tests to check your risk for health problems.  Talk with your doctor about steps you can take to stay healthy or improve your health. You may need to make lifestyle changes to gain or maintain weight and stay healthy, such as getting more healthy foods in your diet and doing exercises to build muscle.  Where can you learn more?  Go to https://www.Torsion Mobile.net/patientEd and enter S176 to learn more about \"Body Mass Index: Care Instructions.\"  Current as of: May 13, 2023               Content Version: 14.0  © 2006-2024 [employer].   Care instructions adapted under license by [facility]. If you have questions about a medical condition or this instruction, always ask your healthcare professional. [employer] disclaims any warranty or liability for your use of this information.

## 2024-08-27 NOTE — PROGRESS NOTES
Pt presents today for pap smear and breast exam.      Last mammogram:   Last pap smear: 2023 negative    Sexually active: Yes  Sexual preference: Male  Contraception: tubal  : 2  Para: 2  AB: 0  Last bone density: never    Last colonoscopy: never   Menarche: age 8   LMP: 2024  Menses: regular

## 2024-08-27 NOTE — PROGRESS NOTES
[facility]  CNM Office Note    Ember Wade is a 39 y.o. female who presents today for her medical conditions/ complaints as noted below.  Chief Complaint   Patient presents with    Annual Exam       HPI  Ember presents for her annual GYN exam. Denies pain. No sexual concerns. Menses regular.        Last mammogram:   Last pap smear: 2023 negative    Sexually active: Yes  Sexual preference: Male  Contraception: tubal  : 2  Para: 2  AB: 0  Last bone density: never    Last colonoscopy: never   Menarche: age 8   LMP: 2024  Menses: regular        Problems/Complaints today:  Patient Active Problem List   Diagnosis    Family history of breast cancer    Family history of colon cancer       Patient's last menstrual period was 2024 (exact date).      History reviewed. No pertinent past medical history.  Past Surgical History:   Procedure Laterality Date     SECTION      TUBAL LIGATION      Dr. Hackett     Family History   Problem Relation Age of Onset    Breast Cancer Mother 65    Colon Cancer Maternal Grandmother         Uterine and color     Social History     Tobacco Use    Smoking status: Never    Smokeless tobacco: Never   Substance Use Topics    Alcohol use: No       Current Outpatient Medications   Medication Sig Dispense Refill    spironolactone (ALDACTONE) 50 MG tablet TAKE 1 TABLET BY MOUTH EVERY DAY 90 tablet 3     No current facility-administered medications for this visit.     No Known Allergies  Vitals:    24 1351   BP: (!) 147/93   Pulse: (!) 101   Weight: 80.7 kg (178 lb)   Height: 1.6 m (5' 3\")     Body mass index is 31.53 kg/m².    A comprehensive review of systems was negative except for what was noted in the HPI.     Physical Exam  Constitutional:       Appearance: Normal appearance.   Genitourinary:      Bladder and urethral meatus normal.      No lesions in the vagina.      Right Labia: No lesions or skin changes.     Left Labia: No  SMEAR    Human papillomavirus (HPV) DNA probe thin prep high risk       PLAN:  1. Encounter for cervical Pap smear with pelvic exam  -     PAP SMEAR  2. Encounter for screening for cervical cancer  -     PAP SMEAR  -     Human papillomavirus (HPV) DNA probe thin prep high risk  3. Encounter for screening for human papillomavirus (HPV)  -     Human papillomavirus (HPV) DNA probe thin prep high risk  4. Encounter for screening mammogram for malignant neoplasm of breast  -     DUARTE DIGITAL SCREEN W OR WO CAD BILATERAL; Future        I Gloria Dover LPN, am scribing for and in the presence of Lianne Hercules CNM  8/27/24  2:36 PM CDT   I have seen and examined the patient independently.  I reviewed all laboratory and imaging studies that are relevant.  I have reviewed and made any appropriate changes to the HPI.    Electronically signed by CALDERON Sethi CNM on 8/27/24  at 9:01 PM CDT

## 2024-09-20 DIAGNOSIS — L68.0 HIRSUTISM: ICD-10-CM

## 2024-09-20 RX ORDER — SPIRONOLACTONE 50 MG/1
TABLET, FILM COATED ORAL
Qty: 90 TABLET | Refills: 3 | Status: SHIPPED | OUTPATIENT
Start: 2024-09-20

## 2024-09-25 ENCOUNTER — HOSPITAL ENCOUNTER (OUTPATIENT)
Dept: WOMENS IMAGING | Age: 39
Discharge: HOME | End: 2024-09-25
Attending: ADVANCED PRACTICE MIDWIFE
Payer: MEDICAID

## 2024-09-25 VITALS — WEIGHT: 184 LBS | BODY MASS INDEX: 32.59 KG/M2

## 2024-09-25 DIAGNOSIS — Z12.31 ENCOUNTER FOR SCREENING MAMMOGRAM FOR MALIGNANT NEOPLASM OF BREAST: ICD-10-CM

## 2024-09-25 PROCEDURE — 77063 BREAST TOMOSYNTHESIS BI: CPT

## 2025-04-11 ENCOUNTER — TELEPHONE (OUTPATIENT)
Dept: OBGYN CLINIC | Age: 40
End: 2025-04-11

## 2025-04-11 NOTE — TELEPHONE ENCOUNTER
Pt called lm on vm wanting second opinion for the lumps left flank, states she has had about year and causing discomfort, she seen her pcp they were unsure what it was then sent her to dermatologist they states its too deep in the tissue and unsure what it was either, she states she had u/s and it was neg for anything but would like Tara to look at it to get her opinion.

## 2025-04-19 SDOH — ECONOMIC STABILITY: TRANSPORTATION INSECURITY
IN THE PAST 12 MONTHS, HAS LACK OF TRANSPORTATION KEPT YOU FROM MEETINGS, WORK, OR FROM GETTING THINGS NEEDED FOR DAILY LIVING?: NO

## 2025-04-19 SDOH — ECONOMIC STABILITY: FOOD INSECURITY: WITHIN THE PAST 12 MONTHS, THE FOOD YOU BOUGHT JUST DIDN'T LAST AND YOU DIDN'T HAVE MONEY TO GET MORE.: NEVER TRUE

## 2025-04-19 SDOH — ECONOMIC STABILITY: FOOD INSECURITY: WITHIN THE PAST 12 MONTHS, YOU WORRIED THAT YOUR FOOD WOULD RUN OUT BEFORE YOU GOT MONEY TO BUY MORE.: NEVER TRUE

## 2025-04-19 SDOH — ECONOMIC STABILITY: INCOME INSECURITY: IN THE LAST 12 MONTHS, WAS THERE A TIME WHEN YOU WERE NOT ABLE TO PAY THE MORTGAGE OR RENT ON TIME?: NO

## 2025-04-19 SDOH — ECONOMIC STABILITY: TRANSPORTATION INSECURITY
IN THE PAST 12 MONTHS, HAS THE LACK OF TRANSPORTATION KEPT YOU FROM MEDICAL APPOINTMENTS OR FROM GETTING MEDICATIONS?: NO

## 2025-04-19 ASSESSMENT — PATIENT HEALTH QUESTIONNAIRE - PHQ9
SUM OF ALL RESPONSES TO PHQ9 QUESTIONS 1 & 2: 0
SUM OF ALL RESPONSES TO PHQ QUESTIONS 1-9: 0
SUM OF ALL RESPONSES TO PHQ QUESTIONS 1-9: 0
1. LITTLE INTEREST OR PLEASURE IN DOING THINGS: NOT AT ALL
2. FEELING DOWN, DEPRESSED OR HOPELESS: NOT AT ALL
SUM OF ALL RESPONSES TO PHQ QUESTIONS 1-9: 0
SUM OF ALL RESPONSES TO PHQ QUESTIONS 1-9: 0
1. LITTLE INTEREST OR PLEASURE IN DOING THINGS: NOT AT ALL
2. FEELING DOWN, DEPRESSED OR HOPELESS: NOT AT ALL

## 2025-04-22 ENCOUNTER — OFFICE VISIT (OUTPATIENT)
Dept: OBGYN CLINIC | Age: 40
End: 2025-04-22
Payer: MEDICAID

## 2025-04-22 VITALS
HEIGHT: 63 IN | WEIGHT: 183 LBS | BODY MASS INDEX: 32.43 KG/M2 | HEART RATE: 92 BPM | SYSTOLIC BLOOD PRESSURE: 138 MMHG | DIASTOLIC BLOOD PRESSURE: 88 MMHG

## 2025-04-22 DIAGNOSIS — D17.79 LIPOMA OF OTHER SPECIFIED SITES: Primary | ICD-10-CM

## 2025-04-22 PROCEDURE — 1036F TOBACCO NON-USER: CPT | Performed by: ADVANCED PRACTICE MIDWIFE

## 2025-04-22 PROCEDURE — 99213 OFFICE O/P EST LOW 20 MIN: CPT | Performed by: ADVANCED PRACTICE MIDWIFE

## 2025-04-22 PROCEDURE — G8417 CALC BMI ABV UP PARAM F/U: HCPCS | Performed by: ADVANCED PRACTICE MIDWIFE

## 2025-04-22 PROCEDURE — G8427 DOCREV CUR MEDS BY ELIG CLIN: HCPCS | Performed by: ADVANCED PRACTICE MIDWIFE

## 2025-04-22 NOTE — PROGRESS NOTES
Pt presents today with c/o lumps she would like looked at. Pt has seen several providers with different opinions and would like to discuss.

## 2025-04-22 NOTE — PROGRESS NOTES
[facility] OB/GYN  CNM Office Note    Ember Wade is a 39 y.o. female who presents today for her medical conditions/ complaints as noted below.  Chief Complaint   Patient presents with    Other       HPI  HPI   Ember presents to discuss concerns with superficial \"lumps\" and intermittent tenderness she has noticed on her left flank, side, and rib area. She has seen chiropractics and family medicine. No abnormal labs, fevers, rashes, or diagnostic imaging. She is unsure if what she is \"feeling is normal\".   Problems/Complaints today:  Patient Active Problem List   Diagnosis    Family history of breast cancer    Family history of colon cancer       Patient's last menstrual period was 2025.      History reviewed. No pertinent past medical history.  Past Surgical History:   Procedure Laterality Date     SECTION      TUBAL LIGATION      Dr. Hackett     Family History   Problem Relation Age of Onset    Breast Cancer Mother 65    Colon Cancer Maternal Grandmother         Uterine and color     Social History     Tobacco Use    Smoking status: Never    Smokeless tobacco: Never   Substance Use Topics    Alcohol use: No       Current Outpatient Medications   Medication Sig Dispense Refill    spironolactone (ALDACTONE) 50 MG tablet TAKE 1 TABLET BY MOUTH EVERY DAY 90 tablet 3     No current facility-administered medications for this visit.     No Known Allergies  Vitals:    25 0920   BP: 138/88   Pulse: 92   Weight: 83 kg (183 lb)   Height: 1.6 m (5' 3\")     Body mass index is 32.42 kg/m².    A comprehensive review of systems was negative except for what was noted in the HPI.     Physical Exam  Constitutional:       Appearance: Normal appearance. She is well-developed and well-groomed.   HENT:      Head: Normocephalic and atraumatic.      Right Ear: Hearing normal.      Nose: Nose normal.      Mouth/Throat:      Lips: Pink.   Eyes:      Conjunctiva/sclera: Conjunctivae normal.       Ramsey Hampton, APRN-[address]  [id number]  [place] , [address]  [address] [address]  Dept: [phone]  Dept Fax: [phone]    Patient ID: Kaylen Terry is a 21 y.o. female. HPI:  Kaylen Terry is a 21 y.o. female who presents to the office today for a first visit and to establish a relationship with a new primary care provider. Today, the patient complains of  making careless mistakes/lacking attention to detail, has difficulty sustaining attention, does not seem to listen when spoken to directly, fails to follow through on tasks and instructions and exhibits poor organization. This has begun to affect work and other activities of daily living. Patient relates that this has been ongoing since childhood. She reports being on Focalin as a child but stopped it as she got older. She relates that she has continued to struggle but she is currently having a hard time managing. Pt denies any fever or chills. Pt today denies any HA, chest pain, or SOB. Pt denies any N/V/D/C or abdominal pain. Patient denies any major medical history. He does not take any medications daily. She does follow with Dr. Leana Fothergill for her routine GYN exams. She does relate that Dr. Jesus Garland will be retiring but will be staying with someone in that practice. Previous office notes, labs and diagnostic studies were reviewed prior to and during encounter. The patient's past medical, surgical, social, and family history as well as her current medications and allergies were reviewed as documented in today's encounter by SANDIP Lay. No current outpatient medications on file prior to visit. No current facility-administered medications on file prior to visit. SUBJECTIVE:      Review of Systems   Constitutional:  Negative for activity change, fatigue and fever. HENT:  Negative for congestion, ear pain, rhinorrhea and sore throat.     Respiratory: Negative for cough, chest tightness and shortness of breath. Cardiovascular:  Negative for chest pain and palpitations. Gastrointestinal:  Negative for abdominal distention, abdominal pain, constipation, diarrhea, nausea and vomiting. Endocrine: Negative for polydipsia, polyphagia and polyuria. Genitourinary:  Negative for difficulty urinating and dysuria. Musculoskeletal:  Negative for arthralgias, back pain and myalgias. Skin:  Negative for rash. Neurological:  Negative for dizziness, weakness, light-headedness and headaches. Hematological:  Negative for adenopathy. Psychiatric/Behavioral:  Negative for agitation and behavioral problems. The patient is not nervous/anxious. OBJECTIVE: /74   Pulse 96   Temp 98.4 °F (36.9 °C)   Ht 5' 1\" (1.549 m)   Wt 114 lb (51.7 kg)   LMP 11/16/2022 (Approximate)   SpO2 98%   BMI 21.54 kg/m²     Physical Exam  Vitals and nursing note reviewed. Constitutional:       General: She is not in acute distress. Appearance: Normal appearance. She is well-developed. HENT:      Head: Normocephalic and atraumatic. Cardiovascular:      Rate and Rhythm: Normal rate and regular rhythm. Heart sounds: Normal heart sounds. No murmur heard. Pulmonary:      Effort: Pulmonary effort is normal. No respiratory distress. Breath sounds: Normal breath sounds. Chest:      Chest wall: No tenderness. Abdominal:      General: Bowel sounds are normal.      Palpations: Abdomen is soft. Tenderness: There is no abdominal tenderness. Musculoskeletal:         General: Normal range of motion. Cervical back: Normal range of motion. Right lower leg: No edema. Left lower leg: No edema. Skin:     General: Skin is warm and dry. Findings: No rash. Neurological:      Mental Status: She is alert and oriented to person, place, and time. Psychiatric:         Mood and Affect: Mood normal.         Behavior: Behavior is cooperative. ASSESSMENT:   Diagnosis Orders   1. Encounter to establish care        2. Preventative health care        3. Attention deficit hyperactivity disorder (ADHD), unspecified ADHD type  Dexmethylphenidate HCl ER (FOCALIN XR) 15 MG CP24         PLAN:  1. Encounter to establish care  2. Preventative health care  - Has not seen a provider since her pediatrician, Dr. Melonie de paz [facility])  - We did discuss in detail the and the recommended preventative screening guidelines (HTN, HLD, DM, breast, cervical cancer, colorectal cancer and osteoporosis). - Will cont to follow with  Dr. Gurpreet William or someone in that group as instructed for routine PAP. - Annual mammograms as recommended beginning at the age of 36, unless otherwise indicated. 3. Attention deficit hyperactivity disorder (ADHD), unspecified ADHD type  - After lengthy discussion with patient and in accordance with the DSM-5, it appears that the patient displays symptoms of inattention and/or symptoms of hyperactivity/impulsivity that have persisted for ?6 months. There is clear evidence that the symptoms interfere with or reduce the quality of social, academic, or occupational functioning.   - Will start Focalin XR 15 mg daily. - Will obtain records from Dr. Aneesh Tesfaye  - Will have patient follow up in a month for evaluation.  - OARRS report was assessed during today's visit. - Patient was found to be compliant with narcotic prescription policies  - Med agreement discussed, signed and scanned into media. - This case has been discussed in-depth with my collaborating physician, Dr. Adela Arias MD and she agrees with the plan of care.      - Medications, labs, diagnostic studies, consultations and follow-up as documented in this encounter.     - On this date December 1, 2022,  I have spent greater than 50% of this visit reviewing previous notes, test results and/or face to face with the patient discussing the diagnoses, importance of compliance with the treatment plan, counseling, coordinating care as well as documenting on the day of the visit.      Maurice Waldron, DONN-CNP

## 2025-07-12 ENCOUNTER — APPOINTMENT (OUTPATIENT)
Dept: CT IMAGING | Age: 40
End: 2025-07-12
Payer: MEDICAID

## 2025-07-12 ENCOUNTER — HOSPITAL ENCOUNTER (EMERGENCY)
Age: 40
Discharge: HOME | End: 2025-07-12
Payer: MEDICAID

## 2025-07-12 VITALS
OXYGEN SATURATION: 100 % | HEIGHT: 63 IN | HEART RATE: 100 BPM | WEIGHT: 184 LBS | SYSTOLIC BLOOD PRESSURE: 125 MMHG | TEMPERATURE: 98.2 F | RESPIRATION RATE: 18 BRPM | BODY MASS INDEX: 32.6 KG/M2 | DIASTOLIC BLOOD PRESSURE: 81 MMHG
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
## 2025-07-12 DIAGNOSIS — S39.012A STRAIN OF LUMBAR REGION, INITIAL ENCOUNTER: ICD-10-CM

## 2025-07-12 DIAGNOSIS — S09.90XA CLOSED HEAD INJURY, INITIAL ENCOUNTER: Primary | ICD-10-CM

## 2025-07-12 PROCEDURE — 72131 CT LUMBAR SPINE W/O DYE: CPT

## 2025-07-12 PROCEDURE — 72128 CT CHEST SPINE W/O DYE: CPT

## 2025-07-12 PROCEDURE — 70450 CT HEAD/BRAIN W/O DYE: CPT

## 2025-07-12 PROCEDURE — 99284 EMERGENCY DEPT VISIT MOD MDM: CPT

## 2025-07-12 PROCEDURE — 72125 CT NECK SPINE W/O DYE: CPT

## 2025-07-12 RX ORDER — METHOCARBAMOL 750 MG/1
750 TABLET, FILM COATED ORAL 4 TIMES DAILY PRN
Qty: 12 TABLET | Refills: 0 | Status: SHIPPED | OUTPATIENT
Start: 2025-07-12 | End: 2025-07-15

## 2025-07-12 ASSESSMENT — ENCOUNTER SYMPTOMS
GASTROINTESTINAL NEGATIVE: 1
PHOTOPHOBIA: 0
BACK PAIN: 1
EYES NEGATIVE: 1
RESPIRATORY NEGATIVE: 1

## 2025-07-12 ASSESSMENT — PAIN SCALES - GENERAL: PAINLEVEL_OUTOF10: 1

## 2025-07-12 ASSESSMENT — PAIN - FUNCTIONAL ASSESSMENT: PAIN_FUNCTIONAL_ASSESSMENT: 0-10

## 2025-07-13 NOTE — DISCHARGE INSTRUCTIONS
As discussed in the ED, your injury may have led to a concussion.  Concussions are diagnosed based on symptoms, and do not show up on any head imaging.  Symptoms often do not fully present themselves until several days after the injury, and can take several weeks to fully resolve. You may take Tylenol and ibuprofen for your pain.  Please follow-up with your primary care provider and with occupational health for further evaluation and recommendations and return to the ED for any new or worsening symptoms or concerns.  Concussions are treated symptomatically with avoidance of triggers and with over-the-counter pain medication such as Tylenol and ibuprofen.  Triggers may include bright lights, loud noises, strenuous activity, you should rest, maintain adequate hydration, and follow-up with your primary for ongoing care.  Please return to the ED for any new or worsening symptoms or concerns.  Please do not exceed 3000 mg a day of Tylenol/acetaminophen from all sources.  You may use ice, heating packs, and the muscle relaxer that was prescribed for further symptomatic management.  You should continue gentle range of motion activities and daily activities as before, but we recommend you avoid strenuous activity until your symptoms are improved.  Please return to the ED for any new or worsening symptoms or concerns

## 2025-07-13 NOTE — ED PROVIDER NOTES
[facility] EMERGENCY DEPARTMENT  EMERGENCY DEPARTMENT ENCOUNTER      Pt Name: Ember Wade  MRN: [id number]  Birthdate 1985  Date of evaluation: 7/12/2025  Provider: Sierra Gray PA-C    CHIEF COMPLAINT       Chief Complaint   Patient presents with    Fall     Pt presents to ED with c/o lower back pain upper back pain neck pain and tingling in arms, pt fell back wards. And hit the back of her head on gravel. Denies LOC      HISTORY OF PRESENT ILLNESS   (Location/Symptom, Timing/Onset,Context/Setting, Quality, Duration, Modifying Factors, Severity)  Note limiting factors.   HPI    Ember Wade is a 40 y.o. female who is denying any pertinent past medical history who presents to the emergency department with a chief complaint of fall.  Patient was working out in her shed when she tripped over something and fell backwards.  She states that she struck her lumbar region on the ground, and then that caused her head to whipped back and strike the gravel.  She did not lose consciousness, denies nausea, vomiting, but states that immediately after hitting her head she felt tingly in all her extremities.  She was able to stand and ambulate, was trying to wait to see if it would get better, but she continued to have some intermittent tingling in her extremities, so she decided to come to the ER and get checked out.  She is not on any blood thinners, she states that since arriving here, she has had no further episodes of tingling, and she is feeling better.  She states that she almost did not come, because she is feeling well overall.  She denies weakness, changes in bowel or bladder function, numbness pain, shortness of breath, abdominal pain, vision changes, difficulty ambulating, and has no additional complaints at this time.    Patient was placed in a c-collar by nursing staff upon arrival    Nursing Notes were reviewed.    Limitations to history: None  Outside historians none    REVIEW OF SYSTEMS    (2-9 systems

## 2025-07-22 ENCOUNTER — OFFICE VISIT (OUTPATIENT)
Dept: NEUROSURGERY | Facility: CLINIC | Age: 40
End: 2025-07-22
Payer: COMMERCIAL

## 2025-07-22 ENCOUNTER — HOSPITAL ENCOUNTER (OUTPATIENT)
Dept: GENERAL RADIOLOGY | Facility: HOSPITAL | Age: 40
Discharge: HOME | End: 2025-07-22
Admitting: NURSE PRACTITIONER
Payer: COMMERCIAL

## 2025-07-22 VITALS — WEIGHT: 181.2 LBS | BODY MASS INDEX: 32.11 KG/M2 | HEIGHT: 63 IN

## 2025-07-22 DIAGNOSIS — R20.2 NUMBNESS AND TINGLING OF BOTH UPPER EXTREMITIES: ICD-10-CM

## 2025-07-22 DIAGNOSIS — R29.2 HYPERREFLEXIA: ICD-10-CM

## 2025-07-22 DIAGNOSIS — M54.2 CERVICALGIA: ICD-10-CM

## 2025-07-22 DIAGNOSIS — E66.811 CLASS 1 OBESITY DUE TO EXCESS CALORIES WITH BODY MASS INDEX (BMI) OF 32.0 TO 32.9 IN ADULT, UNSPECIFIED WHETHER SERIOUS COMORBIDITY PRESENT: ICD-10-CM

## 2025-07-22 DIAGNOSIS — S06.0X0A CONCUSSION WITHOUT LOSS OF CONSCIOUSNESS, INITIAL ENCOUNTER: Primary | ICD-10-CM

## 2025-07-22 DIAGNOSIS — E66.09 CLASS 1 OBESITY DUE TO EXCESS CALORIES WITH BODY MASS INDEX (BMI) OF 32.0 TO 32.9 IN ADULT, UNSPECIFIED WHETHER SERIOUS COMORBIDITY PRESENT: ICD-10-CM

## 2025-07-22 DIAGNOSIS — R20.0 NUMBNESS AND TINGLING OF BOTH UPPER EXTREMITIES: ICD-10-CM
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
## 2025-07-22 PROCEDURE — 72052 X-RAY EXAM NECK SPINE 6/>VWS: CPT

## 2025-07-22 RX ORDER — CYCLOBENZAPRINE HCL 10 MG
10 TABLET ORAL NIGHTLY
Qty: 30 TABLET | Refills: 0 | Status: SHIPPED | OUTPATIENT
Start: 2025-07-22

## 2025-07-22 RX ORDER — METHYLPREDNISOLONE 4 MG/1
TABLET ORAL
Qty: 21 TABLET | Refills: 0 | Status: SHIPPED | OUTPATIENT
Start: 2025-07-22

## 2025-07-22 RX ORDER — CELECOXIB 100 MG/1
100 CAPSULE ORAL 2 TIMES DAILY PRN
Qty: 60 CAPSULE | Refills: 0 | Status: SHIPPED | OUTPATIENT
Start: 2025-07-22

## 2025-07-22 NOTE — PROGRESS NOTES
Primary Care Provider: Aubree Campbell APRN    Chief Complaint:   Chief Complaint   Patient presents with    Head Injury     Pt is here with complaints of pressure feeling in head and headache.        HPI  Georgie Mcmanus   History of Present Illness  The patient is a 40-year-old female who presents today for evaluation of a fall.  She presents today with her .    She experienced a fall on 07/12/2025, around 3:00 or 4:00 PM, during which she hit her lower back and subsequently her head on the ground. She did not lose consciousness and was able to get up immediately after the incident. Within 30 minutes of the fall, she began experiencing nerve tingling and burning sensations radiating from her spine to her arms and legs, prompting her to seek emergency care.  At no point did she experience upper or lower extremity weakness.  Her upper and lower extremity dysesthesias lasted approximately 1 hour before resolving and have yet to recur.  She had no visible injuries from her fall.  She was evaluated at [facility] ED where imaging of the brain, cervical, thoracic, and lumbar spine were obtained and found to be unremarkable for acute abnormalities.    She now experiences a burning sensation radiating towards her face when pressure is applied to the back of her head. She reports no persistent headaches, no facial weakness or speech issues but mentions a weak muscle sensation when swallowing. She has not experienced slurred speech or dizziness but reports intermittent blurred vision that has been progressively worsening over the week. She has not experienced nausea, vomiting, unilateral body weakness, or word-finding difficulties. She feels more fatigued and emotional than usual.     She reports no current neck or arm pain, weakness in her arms or hands.  Thoracic back pain or radicular pain or dysesthesias in a thoracic distribution.  Lumbar back pain or lower extremity radicular pain, weakness, numbness, or tingling.  Denies  a decline in fine motor skills or decline in gait or balance.  Her symptoms appear to worsen when sitting upright and improves to some extent with OTC Tylenol and ibuprofen.  She was recently evaluated by a chiropractor whom informed her she had a dislocated tailbone, however she only experiences pain with walking with a long stride.  She denies fevers, chills, night sweats, unexplained weight loss, saddle anesthesia, or bowel or bladder dysfunction.  She currently rates the severity of her symptoms 0/10.      ROS  Review of Systems   Constitutional: Negative.    HENT: Negative.     Eyes:  Positive for visual disturbance. Negative for photophobia.   Respiratory: Negative.     Cardiovascular: Negative.    Gastrointestinal: Negative.  Negative for nausea and vomiting.   Endocrine: Negative.    Genitourinary: Negative.    Musculoskeletal: Negative.  Negative for neck stiffness.   Skin: Negative.    Allergic/Immunologic: Negative.    Neurological:  Positive for numbness (resolved). Negative for dizziness, tremors, seizures, syncope, facial asymmetry, speech difficulty, weakness, light-headedness and headaches.   Hematological: Negative.    Psychiatric/Behavioral: Negative.     All other systems reviewed and are negative.    Past Medical History:   Diagnosis Date    Hypertension      Past Surgical History:   Procedure Laterality Date    TUBAL ABDOMINAL LIGATION       Family History: family history includes Cancer in her father, maternal grandmother, mother, and paternal grandfather; Heart disease in her father and maternal grandfather; Stroke in her paternal grandmother.    Social History:  reports that she has never smoked. She has never used smokeless tobacco. She reports current alcohol use. Drug use questions deferred to the physician.    Medications:    Current Outpatient Medications:     celecoxib (CeleBREX) 100 MG capsule, Take 1 capsule by mouth 2 (Two) Times a Day As Needed for Mild Pain., Disp: 60 capsule, Rfl:  "0    cyclobenzaprine (FLEXERIL) 10 MG tablet, Take 1 tablet by mouth Every Night., Disp: 30 tablet, Rfl: 0    metFORMIN (GLUCOPHAGE) 500 MG tablet, Take 500 mg by mouth 2 (Two) Times a Day With Meals., Disp: , Rfl:     methylPREDNISolone (MEDROL) 4 MG dose pack, Take as directed on package instructions., Disp: 21 tablet, Rfl: 0    spironolactone (ALDACTONE) 50 MG tablet, Take 50 mg by mouth Daily., Disp: , Rfl: 1    Allergies:  Patient has no known allergies.    Objective   Ht 160 cm (63\")   Wt 82.2 kg (181 lb 3.2 oz)   BMI 32.10 kg/m²   Physical Exam  Vitals and nursing note reviewed.   Constitutional:       General: She is not in acute distress.     Appearance: Normal appearance. She is well-developed and well-groomed. She is obese. She is not ill-appearing, toxic-appearing or diaphoretic.      Comments: BMI 32.10   HENT:      Head: Normocephalic and atraumatic.      Right Ear: Hearing normal.      Left Ear: Hearing normal.   Eyes:      General: Lids are normal.      Extraocular Movements: Extraocular movements intact.      Conjunctiva/sclera: Conjunctivae normal.      Pupils: Pupils are equal, round, and reactive to light.   Neck:      Trachea: Trachea normal.   Cardiovascular:      Rate and Rhythm: Normal rate and regular rhythm.   Pulmonary:      Effort: Pulmonary effort is normal. No tachypnea, bradypnea, accessory muscle usage or respiratory distress.   Abdominal:      Palpations: Abdomen is soft.   Musculoskeletal:      Cervical back: Full passive range of motion without pain and neck supple.   Skin:     General: Skin is warm and dry.   Neurological:      GCS: GCS eye subscore is 4. GCS verbal subscore is 5. GCS motor subscore is 6.      Coordination: Coordination is intact.      Deep Tendon Reflexes:      Reflex Scores:       Tricep reflexes are 3+ on the right side and 3+ on the left side.       Bicep reflexes are 3+ on the right side and 3+ on the left side.       Brachioradialis reflexes are 3+ on the " right side and 3+ on the left side.       Patellar reflexes are 2+ on the right side and 2+ on the left side.       Achilles reflexes are 2+ on the right side and 2+ on the left side.  Psychiatric:         Speech: Speech normal.         Behavior: Behavior normal. Behavior is cooperative.       Neurological Exam  Mental Status  Awake, alert and oriented to person, place and time. Speech is normal. Language is fluent with no aphasia. Attention and concentration are normal.    Cranial Nerves  CN II: Visual acuity is normal.  CN III, IV, VI: Extraocular movements intact bilaterally. Normal lids and orbits bilaterally. Pupils equal round and reactive to light bilaterally.  CN V: Facial sensation is normal.  CN VII: Full and symmetric facial movement.  CN IX, X: Palate elevates symmetrically  CN XI: Shoulder shrug strength is normal.    Motor  Normal muscle bulk throughout. Normal muscle tone.                                               Right                     Left  Toe extension                        5                          5                                             Right                     Left  Deltoid                                   5                          5   Biceps                                   5                          5   Triceps                                  5                          5   Wrist extensor                       5                          5   Finger flexor                          5                          5   Iliopsoas                               5                          5   Quadriceps                           5                          5   Gastrocnemius                     5                           5   Anterior tibialis                      5                          5    Sensory  Light touch is normal in upper and lower extremities.     Reflexes                                            Right                      Left  Brachioradialis                    3+                          3+  Biceps                                 3+                         3+  Triceps                                3+                         3+  Patellar                                2+                         2+  Achilles                                2+                         2+  Right Plantar: downgoing  Left Plantar: upgoing    Right pathological reflexes: Clyde's absent. Ankle clonus absent.  Left pathological reflexes: Clyde's absent. Ankle clonus absent.    Coordination    Finger-to-nose, rapid alternating movements and heel-to-shin normal bilaterally without dysmetria.    Gait  Casual gait is normal including stance, stride, and arm swing.    Female  strength (pounds)  AGE Right Hand RH Norms Left Hand LH Norms   20-24  70±14.5  61±13.1   25-29  75±13.9  63.5±12   30-34  79±19.2  68±17.7   35-39  74±10.8  66±11.7   40-44 *82 70±13.5 78 62±13.8   45-49  62±15.1  56±12.7   50-54  66±11.6  57±10.7   55-59  57±12.5  47±11.9   60-64  55±10.1  46±10.1   65-69  50±9.7  41±8.2   70-74  50±11.7  42±10.2   75+  43±11.0  38±8.9   (MILADY Smith et al; Hand Dynometer: Effects of trials and sessions.  Perpetual and Motor Skills 61:195-8, 1985)  * = Dominant hand  > = Intervention    Imaging: (independent review and interpretation)  7/12/2025.  CT head is unremarkable for acute abnormalities.      7/12/2025.  CT of the cervical spine shows no evidence of acute fractures, mild degenerative disc disease.      7/12/2025.  CT of the thoracic spine is unremarkable for acute abnormalities, mild degenerative disc disease.      7/12/2025.  CT of the lumbar spine is unremarkable for acute abnormalities, degenerative disc disease most notable at L5-S1.      ASSESSMENT and PLAN  Georgie Mcmanus is a 40 y.o. female with significant medical comorbidities to include hypertension and obesity.  She presents today for evaluation after a fall from standing height that occurred on 7/12/2025 with an immediate onset of  upper and lower extremity burning dysesthesias which have since resolved and persistent symptoms to include, but not limited to intermittent blurred vision, increased fatigue/fogginess, and emotional lability.  Physical exam findings of upper extremity hyperreflexia and left Babinski.  Her imaging, CT of the head, cervical, thoracic, and lumbar spine were unremarkable for acute abnormalities.    Recommendations  Mild concussion without loss of consciousness   Georgie Mcmanus has the signs and symptoms consistent with a mild concussion.   We discussed signs to watch for which in include headaches get worse, drowsiness, difficulty recognizing people or places, repetition, vomiting, seizure-like activity, weakness in arms and legs, emotional lability, increased sleepiness or restlessness, or unsteadiness and slurred speech. In general  90% of concussions resolved within 7-10 days.    Cervicalgia  Diffuse upper and lower extremity burning dysesthesias, resolved  Hyperreflexia  For further evaluation we will proceed today by obtaining x-rays of the cervical spine complete with flexion and extension to assess for areas of instability.  As a means of first-line conservative management for neck pain, we will send her for a dedicated course of physician directed physical therapy; Rx provided.    Georgie denies a history of renal insufficiency or contraindications for NSAIDs, therefore I recommended she discontinue use of ibuprofen and I will provide her with a trial prescription for Celebrex and Flexeril, as well as a prescription for Medrol Dosepak.  Benefits, risk, adverse effects, and use discussed. We will have her return for reassessment with me after completion of physical therapy.  I advised the patient to call to return sooner for new or worsening complaints of weakness, paresthesias, gait disturbances, or any additional concerns.  Treatment options discussed in detail with Georgie and spouse and they voiced understanding  and agree with this plan of care.    Class 1 obesity (BMI 30.0-34.9) due to excessive calories with serious comorbidities BMI of 30.0-30.9 in adult  Body mass index is 32.1 kg/m². Information on the DASH diet provided in the AVS.  We will continue to provided diet and exercise information with the goal of weight loss at each scheduled appointment.     Diagnoses and all orders for this visit:    1. Concussion without loss of consciousness, initial encounter (Primary)    2. Cervicalgia  -     XR Spine Cervical Complete With Flex Ext; Future  -     Ambulatory Referral to Physical Therapy for Evaluation & Treatment  -     cyclobenzaprine (FLEXERIL) 10 MG tablet; Take 1 tablet by mouth Every Night.  Dispense: 30 tablet; Refill: 0  -     methylPREDNISolone (MEDROL) 4 MG dose pack; Take as directed on package instructions.  Dispense: 21 tablet; Refill: 0  -     celecoxib (CeleBREX) 100 MG capsule; Take 1 capsule by mouth 2 (Two) Times a Day As Needed for Mild Pain.  Dispense: 60 capsule; Refill: 0    3. Numbness and tingling of both upper extremities    4. Hyperreflexia    5. Fall, initial encounter    6. Class 1 obesity due to excess calories with body mass index (BMI) of 32.0 to 32.9 in adult, unspecified whether serious comorbidity present      Return for FOLLOW WITH LEONEL AFTER COMPLETION OF PT .    Thank you for this Consultation and the opportunity to participate in Georgie's care.    Sincerely,    REAGAN Westfall

## 2025-07-22 NOTE — PATIENT INSTRUCTIONS
"DASH Eating Plan  DASH stands for Dietary Approaches to Stop Hypertension. The DASH eating plan is a healthy eating plan that has been shown to:  Lower high blood pressure (hypertension).  Reduce your risk for type 2 diabetes, heart disease, and stroke.  Help with weight loss.  What are tips for following this plan?  Reading food labels  Check food labels for the amount of salt (sodium) per serving. Choose foods with less than 5 percent of the Daily Value (DV) of sodium. In general, foods with less than 300 milligrams (mg) of sodium per serving fit into this eating plan.  To find whole grains, look for the word \"whole\" as the first word in the ingredient list.  Shopping  Buy products labeled as \"low-sodium\" or \"no salt added.\"  Buy fresh foods. Avoid canned foods and pre-made or frozen meals.  Cooking  Try not to add salt when you cook. Use salt-free seasonings or herbs instead of table salt or sea salt. Check with your health care provider or pharmacist before using salt substitutes.  Do not munson foods. Cook foods in healthy ways, such as baking, boiling, grilling, roasting, or broiling.  Cook using oils that are good for your heart. These include olive, canola, avocado, soybean, and sunflower oil.  Meal planning    Eat a balanced diet. This should include:  4 or more servings of fruits and 4 or more servings of vegetables each day. Try to fill half of your plate with fruits and vegetables.  6-8 servings of whole grains each day.  6 or less servings of lean meat, poultry, or fish each day. 1 oz is 1 serving. A 3 oz (85 g) serving of meat is about the same size as the palm of your hand. One egg is 1 oz (28 g).  2-3 servings of low-fat dairy each day. One serving is 1 cup (237 mL).  1 serving of nuts, seeds, or beans 5 times each week.  2-3 servings of heart-healthy fats. Healthy fats called omega-3 fatty acids are found in foods such as walnuts, flaxseeds, fortified milks, and eggs. These fats are also found in " cold-water fish, such as sardines, salmon, and mackerel.  Limit how much you eat of:  Canned or prepackaged foods.  Food that is high in trans fat, such as fried foods.  Food that is high in saturated fat, such as fatty meat.  Desserts and other sweets, sugary drinks, and other foods with added sugar.  Full-fat dairy products.  Do not salt foods before eating.  Do not eat more than 4 egg yolks a week.  Try to eat at least 2 vegetarian meals a week.  Eat more home-cooked food and less restaurant, buffet, and fast food.  Lifestyle  When eating at a restaurant, ask if your food can be made with less salt or no salt.  If you drink alcohol:  Limit how much you have to:  0-1 drink a day if you are female.  0-2 drinks a day if you are male.  Know how much alcohol is in your drink. In the [place] one drink is one 12 oz bottle of beer (355 mL), one 5 oz glass of wine (148 mL), or one 1½ oz glass of hard liquor (44 mL).  General information  Avoid eating more than 2,300 mg of salt a day. If you have hypertension, you may need to reduce your sodium intake to 1,500 mg a day.  Work with your provider to stay at a healthy body weight or lose weight. Ask what the best weight range is for you.  On most days of the week, get at least 30 minutes of exercise that causes your heart to beat faster. This may include walking, swimming, or biking.  Work with your provider or dietitian to adjust your eating plan to meet your specific calorie needs.  What foods should I eat?  Fruits  All fresh, dried, or frozen fruit. Canned fruits that are in their natural juice and do not have sugar added to them.  Vegetables  Fresh or frozen vegetables that are raw, steamed, roasted, or grilled. Low-sodium or reduced-sodium tomato and vegetable juice. Low-sodium or reduced-sodium tomato sauce and tomato paste. Low-sodium or reduced-sodium canned vegetables.  Grains  Whole-grain or whole-wheat bread. Whole-grain or whole-wheat pasta. Brown rice. Oatmeal.  Quinoa. Bulgur. Whole-grain and low-sodium cereals. Terrie bread. Low-fat, low-sodium crackers. Whole-wheat flour tortillas.  Meats and other proteins  Skinless chicken or turkey. Ground chicken or turkey. Pork with fat trimmed off. Fish and seafood. Egg whites. Dried beans, peas, or lentils. Unsalted nuts, nut butters, and seeds. Unsalted canned beans. Lean cuts of beef with fat trimmed off. Low-sodium, lean precooked or cured meat, such as sausages or meat loaves.  Dairy  Low-fat (1%) or fat-free (skim) milk. Reduced-fat, low-fat, or fat-free cheeses. Nonfat, low-sodium ricotta or cottage cheese. Low-fat or nonfat yogurt. Low-fat, low-sodium cheese.  Fats and oils  Soft margarine without trans fats. Vegetable oil. Reduced-fat, low-fat, or light mayonnaise and salad dressings (reduced-sodium). Canola, safflower, olive, avocado, soybean, and sunflower oils. Avocado.  Seasonings and condiments  Herbs. Spices. Seasoning mixes without salt.  Other foods  Unsalted popcorn and pretzels. Fat-free sweets.  The items listed above may not be all the foods and drinks you can have. Talk to a dietitian to learn more.  What foods should I avoid?  Fruits  Canned fruit in a light or heavy syrup. Fried fruit. Fruit in cream or butter sauce.  Vegetables  Creamed or fried vegetables. Vegetables in a cheese sauce. Regular canned vegetables that are not marked as low-sodium or reduced-sodium. Regular canned tomato sauce and paste that are not marked as low-sodium or reduced-sodium. Regular tomato and vegetable juices that are not marked as low-sodium or reduced-sodium. Pickles. Olives.  Grains  Baked goods made with fat, such as croissants, muffins, or some breads. Dry pasta or rice meal packs.  Meats and other proteins  Fatty cuts of meat. Ribs. Fried meat. Roberts. Bologna, salami, and other precooked or cured meats, such as sausages or meat loaves, that are not lean and low in sodium. Fat from the back of a pig (fatback). Miryam.  Salted nuts and seeds. Canned beans with added salt. Canned or smoked fish. Whole eggs or egg yolks. Chicken or turkey with skin.  Dairy  Whole or 2% milk, cream, and half-and-half. Whole or full-fat cream cheese. Whole-fat or sweetened yogurt. Full-fat cheese. Nondairy creamers. Whipped toppings. Processed cheese and cheese spreads.  Fats and oils  Butter. Stick margarine. Lard. Shortening. Ghee. Roberts fat. Tropical oils, such as coconut, palm kernel, or palm oil.  Seasonings and condiments  Onion salt, garlic salt, seasoned salt, table salt, and sea salt. Worcestershire sauce. Tartar sauce. Barbecue sauce. Teriyaki sauce. Soy sauce, including reduced-sodium soy sauce. Steak sauce. Canned and packaged gravies. Fish sauce. Oyster sauce. Cocktail sauce. Store-bought horseradish. Ketchup. Mustard. Meat flavorings and tenderizers. Bouillon cubes. Hot sauces. Pre-made or packaged marinades. Pre-made or packaged taco seasonings. Relishes. Regular salad dressings.  Other foods  Salted popcorn and pretzels.  The items listed above may not be all the foods and drinks you should avoid. Talk to a dietitian to learn more.  Where to find more information  National Heart, Lung, and [place] (NHLBI): nhlbi.nih.gov  American Heart Association (AHA): heart.org  Academy of Nutrition and Dietetics: eatright.org  National Kidney Foundation (NKF): kidney.org  This information is not intended to replace advice given to you by your health care provider. Make sure you discuss any questions you have with your health care provider.  Document Revised: 01/04/2024 Document Reviewed: 01/04/2024  Elsevier Patient Education © 2024 [employer]

## 2025-08-28 ENCOUNTER — OFFICE VISIT (OUTPATIENT)
Dept: OBGYN CLINIC | Age: 40
End: 2025-08-28
Payer: MEDICAID

## 2025-08-28 VITALS
BODY MASS INDEX: 32.24 KG/M2 | WEIGHT: 182 LBS | SYSTOLIC BLOOD PRESSURE: 127 MMHG | HEART RATE: 87 BPM | DIASTOLIC BLOOD PRESSURE: 81 MMHG

## 2025-08-28 DIAGNOSIS — Z12.31 ENCOUNTER FOR SCREENING MAMMOGRAM FOR MALIGNANT NEOPLASM OF BREAST: ICD-10-CM

## 2025-08-28 DIAGNOSIS — N94.3 PMS (PREMENSTRUAL SYNDROME): ICD-10-CM

## 2025-08-28 DIAGNOSIS — Z76.0 MEDICATION REFILL: ICD-10-CM

## 2025-08-28 DIAGNOSIS — Z11.51 ENCOUNTER FOR SCREENING FOR HUMAN PAPILLOMAVIRUS (HPV): ICD-10-CM

## 2025-08-28 DIAGNOSIS — Z01.419 ENCOUNTER FOR CERVICAL PAP SMEAR WITH PELVIC EXAM: Primary | ICD-10-CM

## 2025-08-28 DIAGNOSIS — Z12.4 ENCOUNTER FOR SCREENING FOR CERVICAL CANCER: ICD-10-CM

## 2025-08-28 DIAGNOSIS — L68.0 HIRSUTISM: ICD-10-CM

## 2025-08-28 DIAGNOSIS — Z51.81 MEDICATION MONITORING ENCOUNTER: ICD-10-CM

## 2025-08-28 LAB — HPV16+18+H RISK 12 DNA SPEC-IMP: NORMAL

## 2025-08-28 PROCEDURE — 99396 PREV VISIT EST AGE 40-64: CPT | Performed by: ADVANCED PRACTICE MIDWIFE

## 2025-08-28 RX ORDER — SPIRONOLACTONE 50 MG/1
TABLET, FILM COATED ORAL
Qty: 90 TABLET | Refills: 3 | Status: SHIPPED | OUTPATIENT
Start: 2025-08-28

## 2025-09-02 ENCOUNTER — TELEPHONE (OUTPATIENT)
Dept: OBGYN CLINIC | Age: 40
End: 2025-09-02
